# Patient Record
Sex: FEMALE | Race: ASIAN | NOT HISPANIC OR LATINO | URBAN - METROPOLITAN AREA
[De-identification: names, ages, dates, MRNs, and addresses within clinical notes are randomized per-mention and may not be internally consistent; named-entity substitution may affect disease eponyms.]

---

## 2017-12-07 ENCOUNTER — EMERGENCY (EMERGENCY)
Facility: HOSPITAL | Age: 82
LOS: 1 days | Discharge: HOME | End: 2017-12-07
Attending: EMERGENCY MEDICINE | Admitting: EMERGENCY MEDICINE
Payer: MEDICARE

## 2017-12-07 VITALS
TEMPERATURE: 100 F | RESPIRATION RATE: 18 BRPM | DIASTOLIC BLOOD PRESSURE: 98 MMHG | HEART RATE: 87 BPM | SYSTOLIC BLOOD PRESSURE: 166 MMHG | OXYGEN SATURATION: 98 %

## 2017-12-07 PROCEDURE — 99282 EMERGENCY DEPT VISIT SF MDM: CPT

## 2017-12-07 NOTE — ED ADULT NURSE NOTE - PMH
Hypertension, unspecified type Atrial fibrillation, unspecified type    Hypertension, unspecified type

## 2017-12-07 NOTE — ED PROVIDER NOTE - ATTENDING CONTRIBUTION TO CARE
Attending MD Camilo:   I personally have seen and examined this patient.  Physician assistant note reviewed and agree on plan of care and except where noted.  See below for details.     GRANDDAUGHTER ACTING AS  FOR MANDARIN.  DECLINED TELEPHONE .    85F with PMH including AFib on Plavix, HTN, GERD presents to the ED with bleeding L middle toe.  Reports that she was at podiatrist who shaves layers from the plantar aspect of toes when took too much and L middle toe began to bleed.  Has continued to do so since incident at 10am this morning.  Denies holding pressure.  Reports has applied gauze but not continuous pressure.  Denies dizziness, weakness, sensory changes.  Denies LOC. Denies fevers, chills.  On exam, NAD, moving all extremities, ambulating with steady gait, +L third/middle toe with slow oozing from a <1mm area despite continuous pressure for 5 min by this MD, no surrounding erythema, cap refill < 2s, moving toe, sensory grossly intact; A/P: 85F with slow oozing from 3rd L toe, will apply surgicel for hemostasis and discharge to follow up with PMD tomorrow. Family verbalized understanding.  Follow up instructions given, importance of follow up emphasized, return to ED parameters reviewed and patient verbalized understanding.  All questions answered, all concerns addressed.

## 2017-12-07 NOTE — ED ADULT NURSE NOTE - OBJECTIVE STATEMENT
86 yo female A&OX3 presents to the ED with the c/o l 2nd digit foot pain and bleeding s/p clipping nails. Pt states that she was cutting her nails when she cut the back of her toe. Pt on plavix, toe is still actively bleeding. No numbness or tingling, pt able to move toe. + pulses in l foot.

## 2017-12-07 NOTE — ED PROVIDER NOTE - CARE PLAN
Principal Discharge DX:	Abrasion  Instructions for follow-up, activity and diet:	1. You may apply more SurgiCel dressing as needed for continued bleeding.   2. Follow up with your Primary Care Physician as soon as possible for further evaluation.   3. Return to the Emergency Department for any concerning symptoms.

## 2017-12-07 NOTE — ED PROVIDER NOTE - OBJECTIVE STATEMENT
85 y.o. female hx of HTN, Afib on plavix p/w left middle toe bleeding. Patient was trimming her nails this morning and accidentally shaved off several layers of skin on the pad of the toe. She has been unable to the slow oozing of blood since then. She denies pain, numbness/tingling, redness, fevers, chills. 85 y.o. female hx of HTN, Afib on plavix p/w left middle toe bleeding. Patient was at her podiatrist who trimmed her nails this morning and accidentally shaved off several layers of skin on the pad of the toe. She has been unable to stop the slow oozing of blood since then. She denies pain, numbness/tingling, redness, fevers, chills.  Podiatrist: Dr. Johnnie Stokes

## 2017-12-07 NOTE — ED ADULT TRIAGE NOTE - CHIEF COMPLAINT QUOTE
Left 3rd toe bleeding since 1000 this morning. Left 3rd toe bleeding since 1000 this morning.  on plavix

## 2017-12-07 NOTE — ED PROVIDER NOTE - PHYSICAL EXAMINATION
Superficial abrasion on the pad of the left middle toe, slow ooze of blood, otherwise clean without surrounding erythema or purulent discharge.

## 2017-12-07 NOTE — ED PROVIDER NOTE - PLAN OF CARE
1. You may apply more SurgiCel dressing as needed for continued bleeding.   2. Follow up with your Primary Care Physician as soon as possible for further evaluation.   3. Return to the Emergency Department for any concerning symptoms.

## 2017-12-08 PROBLEM — Z00.00 ENCOUNTER FOR PREVENTIVE HEALTH EXAMINATION: Status: ACTIVE | Noted: 2017-12-08

## 2018-08-01 ENCOUNTER — OUTPATIENT (OUTPATIENT)
Dept: OUTPATIENT SERVICES | Facility: HOSPITAL | Age: 83
LOS: 1 days | End: 2018-08-01
Payer: MEDICARE

## 2018-08-01 PROCEDURE — G9001: CPT

## 2018-08-05 ENCOUNTER — EMERGENCY (EMERGENCY)
Facility: HOSPITAL | Age: 83
LOS: 1 days | Discharge: HOME | End: 2018-08-05
Attending: PERSONAL EMERGENCY RESPONSE ATTENDANT
Payer: MEDICARE

## 2018-08-05 VITALS
TEMPERATURE: 98 F | SYSTOLIC BLOOD PRESSURE: 155 MMHG | DIASTOLIC BLOOD PRESSURE: 89 MMHG | RESPIRATION RATE: 18 BRPM | OXYGEN SATURATION: 98 % | HEART RATE: 99 BPM

## 2018-08-05 PROCEDURE — 71045 X-RAY EXAM CHEST 1 VIEW: CPT | Mod: 26

## 2018-08-05 PROCEDURE — 73501 X-RAY EXAM HIP UNI 1 VIEW: CPT

## 2018-08-05 PROCEDURE — 72125 CT NECK SPINE W/O DYE: CPT | Mod: 26

## 2018-08-05 PROCEDURE — 99284 EMERGENCY DEPT VISIT MOD MDM: CPT | Mod: 25

## 2018-08-05 PROCEDURE — 71045 X-RAY EXAM CHEST 1 VIEW: CPT

## 2018-08-05 PROCEDURE — 70450 CT HEAD/BRAIN W/O DYE: CPT

## 2018-08-05 PROCEDURE — 70450 CT HEAD/BRAIN W/O DYE: CPT | Mod: 26

## 2018-08-05 PROCEDURE — 73502 X-RAY EXAM HIP UNI 2-3 VIEWS: CPT

## 2018-08-05 PROCEDURE — 99284 EMERGENCY DEPT VISIT MOD MDM: CPT | Mod: GC

## 2018-08-05 PROCEDURE — 72125 CT NECK SPINE W/O DYE: CPT

## 2018-08-05 PROCEDURE — 73523 X-RAY EXAM HIPS BI 5/> VIEWS: CPT | Mod: 26

## 2018-08-05 NOTE — ED PROVIDER NOTE - ATTENDING CONTRIBUTION TO CARE
Attending MD Marcano.  Agree with above.  Pt is an 86 yr old female with pmhx of afib on clopidogrel who presents to ED s/p mechanical fall at ~1920 today after her  fell and she was attempting to help him up and fell herself.  She endorses posterior head pain, L hip pain.  Was able to stand and ambulate following fall.  Denies n/v/neck pain.  No CP/palpitations before event.  No N/v/d/weakness/numbness/tingling.Pt has ~2 cm ecchymosis to posterior scalp.  CNII-XII intact, PERRLA, EOMI, 5/5 strength bilateral upper and lower extremities.  Intact distal pulses in all extremities.  Pelvic rock stable.  No abdominal TTP.  No LOC prior to, during or following event. Attending MD Marcano.  Agree with above.  Pt is an 86 yr old female with pmhx of afib on clopidogrel who presents to ED s/p mechanical fall at ~1920 today after her  fell and she was attempting to help him up and fell herself.  She endorses posterior head pain, L hip pain.  Was able to stand and ambulate following fall.  Denies n/v/neck pain.  No CP/palpitations before event.  No N/v/d/weakness/numbness/tingling.Pt has ~2 cm ecchymosis to posterior scalp.  CNII-XII intact, PERRLA, EOMI, 5/5 strength bilateral upper and lower extremities.  Intact distal pulses in all extremities.  Pelvic rock stable.  No abdominal TTP.  No LOC prior to, during or following event.  Pt noted to have a large firm hematoma to L inferior glut. No pulsatility, no skin openings.  REmains nvsc intact distal to site.  FROM of L hip, ambulatory without difficulty.  Imaging non-actionable.  Pt's granddaughter instructed that she should use warm compresses for comfort.  COunseled that she will likely have color change at this site with possible yellowing or greenish hue that is likely to become more dependent over time.  Counseled to return to ED for new/worsening pain, sig increased in size or any numbness/tingling of distal LLE.  Pt endorses improvement in sxs prior to discharge.  Granddaughter also updated R R lung Attending MD Marcano.  Agree with above.  Pt is an 86 yr old female with pmhx of afib on clopidogrel who presents to ED s/p mechanical fall at ~1920 today after her  fell and she was attempting to help him up and fell herself.  She endorses posterior head pain, L hip pain.  Was able to stand and ambulate following fall.  Denies n/v/neck pain.  No CP/palpitations before event.  No N/v/d/weakness/numbness/tingling.Pt has ~2 cm ecchymosis to posterior scalp.  CNII-XII intact, PERRLA, EOMI, 5/5 strength bilateral upper and lower extremities.  Intact distal pulses in all extremities.  Pelvic rock stable.  No abdominal TTP.  No LOC prior to, during or following event.  Pt noted to have a large firm hematoma to L inferior glut. No pulsatility, no skin openings.  REmains nvsc intact distal to site.  FROM of L hip, ambulatory without difficulty.  Imaging non-actionable.  Pt's granddaughter instructed that she should use warm compresses for comfort.  COunseled that she will likely have color change at this site with possible yellowing or greenish hue that is likely to become more dependent over time.  Counseled to return to ED for new/worsening pain, sig increased in size or any numbness/tingling of distal LLE.  Pt endorses improvement in sxs prior to discharge.  Granddaughter also updated R lung nodule that will require follow-up.  Stable for discharge, counseled to follow-up with PCP for ongoing management as needed.

## 2018-08-05 NOTE — ED PROVIDER NOTE - PLAN OF CARE
1) Please follow-up with your primary care doctor within the next week.  If you cannot follow-up with your doctor(s), please return to the ED for any urgent issues.  2) If you have any worsening of symptoms or any other concerns please return to the ED immediately.  3) Please continue taking your home medications as directed.  4) You may have been given a copy of your labs and/or imaging.  Please go over these with your primary care doctor.

## 2018-08-05 NOTE — ED ADULT NURSE NOTE - NSIMPLEMENTINTERV_GEN_ALL_ED
Implemented All Fall Risk Interventions:  [place] to call system. Call bell, personal items and telephone within reach. Instruct patient to call for assistance. Room bathroom lighting operational. Non-slip footwear when patient is off stretcher. Physically safe environment: no spills, clutter or unnecessary equipment. Stretcher in lowest position, wheels locked, appropriate side rails in place. Provide visual cue, wrist band, yellow gown, etc. Monitor gait and stability. Monitor for mental status changes and reorient to person, place, and time. Review medications for side effects contributing to fall risk. Reinforce activity limits and safety measures with patient and family.

## 2018-08-05 NOTE — ED PROVIDER NOTE - CARE PLAN
Principal Discharge DX:	Musculoskeletal pain  Assessment and plan of treatment:	1) Please follow-up with your primary care doctor within the next week.  If you cannot follow-up with your doctor(s), please return to the ED for any urgent issues.  2) If you have any worsening of symptoms or any other concerns please return to the ED immediately.  3) Please continue taking your home medications as directed.  4) You may have been given a copy of your labs and/or imaging.  Please go over these with your primary care doctor. Principal Discharge DX:	Musculoskeletal pain  Assessment and plan of treatment:	1) Please follow-up with your primary care doctor within the next week.  If you cannot follow-up with your doctor(s), please return to the ED for any urgent issues.  2) If you have any worsening of symptoms or any other concerns please return to the ED immediately.  3) Please continue taking your home medications as directed.  4) You may have been given a copy of your labs and/or imaging.  Please go over these with your primary care doctor.  Secondary Diagnosis:	Fall from standing, initial encounter

## 2018-08-05 NOTE — ED ADULT NURSE NOTE - OBJECTIVE STATEMENT
86y female presents to ED complaining of fall 86y female presents to ED complaining of fall. Pt is chinese speaking with family translating at bedside. Pt states her  fell and when trying to help him up she fell and hit the top of her head against foor. Pt on plavix for afib. Pt a/ox3 complaining of minor headache, denies any loc, also complaining of L hip pain with 5cm ecchymosis on buttock, tender to palp. Pt also has 2cm bruise on top of head. Pt ambulatory with steady gait. Pt breathing spontaneous and unlabored with lungs clear to auscultation bilaterally. Pt skin is warm, dry and intact with no edema present. Pt abdomen soft, nontender, nondistended. Pt PERRL, with equal strength bilaterally in upper and lower extremities with full sensation. Pt denies chest pain and SOB, denies n/v/d, denies fever/chills and cough, denies dysuria, denies numbness/tingling and weakness.

## 2018-08-05 NOTE — ED ADULT TRIAGE NOTE - CHIEF COMPLAINT QUOTE
Patient presents s/p trip and fall while helping  up. Patient states she fell and hit her head. Patient is currently on plavix.

## 2018-08-05 NOTE — ED PROVIDER NOTE - MEDICAL DECISION MAKING DETAILS
86F pmhx afib on plavix, presents s/p mechanical falls. Pt not endorsing any precipitation symptoms including cp, sob, palpitations. Small hematoma on top of head, no midline c spine ttp. No obvious msk deformities, pelvis stable, neuro intact. Since pt age >65 will obtain CT head, c-spine, cxr, L hip + pelvis films.

## 2018-08-05 NOTE — ED PROVIDER NOTE - OBJECTIVE STATEMENT
86F pmhx afib on plavix, htn, presenting s/p fall ~2 hours prior to arrival in ED. Per granddaughter (interpreting in chinese), pt was helping her  up off the floor after he fell, causing her fall herself. Pt struck the top of her head on the floor but did not lose consciousness. She has a mild headache since then but denies neck pain. She also complains of L hip pain after the fall. Not endorsing n/v, visual changes, problems balancing, sob/cp. Allergic to pcn and sulfa drugs.

## 2018-08-06 PROBLEM — I48.91 UNSPECIFIED ATRIAL FIBRILLATION: Chronic | Status: ACTIVE | Noted: 2017-12-07

## 2018-08-06 PROBLEM — I10 ESSENTIAL (PRIMARY) HYPERTENSION: Chronic | Status: ACTIVE | Noted: 2017-12-07

## 2018-08-29 DIAGNOSIS — Z71.89 OTHER SPECIFIED COUNSELING: ICD-10-CM

## 2019-02-15 ENCOUNTER — EMERGENCY (EMERGENCY)
Facility: HOSPITAL | Age: 84
LOS: 1 days | Discharge: HOME | End: 2019-02-15
Attending: EMERGENCY MEDICINE
Payer: MEDICARE

## 2019-02-15 VITALS
WEIGHT: 80.03 LBS | DIASTOLIC BLOOD PRESSURE: 82 MMHG | HEIGHT: 58 IN | RESPIRATION RATE: 19 BRPM | SYSTOLIC BLOOD PRESSURE: 161 MMHG | TEMPERATURE: 98 F | OXYGEN SATURATION: 98 % | HEART RATE: 83 BPM

## 2019-02-15 PROCEDURE — 99284 EMERGENCY DEPT VISIT MOD MDM: CPT

## 2019-02-15 NOTE — ED ADULT NURSE NOTE - NSIMPLEMENTINTERV_GEN_ALL_ED
Implemented All Universal Safety Interventions:  [place] to call system. Call bell, personal items and telephone within reach. Instruct patient to call for assistance. Room bathroom lighting operational. Non-slip footwear when patient is off stretcher. Physically safe environment: no spills, clutter or unnecessary equipment. Stretcher in lowest position, wheels locked, appropriate side rails in place.

## 2019-02-15 NOTE — ED ADULT NURSE NOTE - OBJECTIVE STATEMENT
87 YOF A&Ox3 presented to ED for right middle toe redness radiating to right calf rated 7/10 of pain. Upon assessment foot and calf warm to touch. Pulses present in all 4 extremities. Patient denies fevers/chills, sob, chest pain, n/v/d, headaches & blurry vision. Patient denies history of DVT's.

## 2019-02-16 VITALS
OXYGEN SATURATION: 98 % | SYSTOLIC BLOOD PRESSURE: 138 MMHG | HEART RATE: 78 BPM | DIASTOLIC BLOOD PRESSURE: 83 MMHG | TEMPERATURE: 98 F | RESPIRATION RATE: 18 BRPM

## 2019-02-16 LAB
ALBUMIN SERPL ELPH-MCNC: 4.8 G/DL — SIGNIFICANT CHANGE UP (ref 3.3–5)
ALP SERPL-CCNC: 58 U/L — SIGNIFICANT CHANGE UP (ref 40–120)
ALT FLD-CCNC: 8 U/L — LOW (ref 10–45)
ANION GAP SERPL CALC-SCNC: 13 MMOL/L — SIGNIFICANT CHANGE UP (ref 5–17)
APPEARANCE UR: CLEAR — SIGNIFICANT CHANGE UP
APTT BLD: 33.1 SEC — SIGNIFICANT CHANGE UP (ref 27.5–36.3)
AST SERPL-CCNC: 26 U/L — SIGNIFICANT CHANGE UP (ref 10–40)
BACTERIA # UR AUTO: 0 — SIGNIFICANT CHANGE UP
BASE EXCESS BLDV CALC-SCNC: 2.1 MMOL/L — HIGH (ref -2–2)
BASOPHILS # BLD AUTO: 0 K/UL — SIGNIFICANT CHANGE UP (ref 0–0.2)
BASOPHILS NFR BLD AUTO: 0.5 % — SIGNIFICANT CHANGE UP (ref 0–2)
BILIRUB SERPL-MCNC: 0.4 MG/DL — SIGNIFICANT CHANGE UP (ref 0.2–1.2)
BILIRUB UR-MCNC: NEGATIVE — SIGNIFICANT CHANGE UP
BUN SERPL-MCNC: 12 MG/DL — SIGNIFICANT CHANGE UP (ref 7–23)
CA-I SERPL-SCNC: 1.24 MMOL/L — SIGNIFICANT CHANGE UP (ref 1.12–1.3)
CALCIUM SERPL-MCNC: 9.8 MG/DL — SIGNIFICANT CHANGE UP (ref 8.4–10.5)
CHLORIDE BLDV-SCNC: 98 MMOL/L — SIGNIFICANT CHANGE UP (ref 96–108)
CHLORIDE SERPL-SCNC: 96 MMOL/L — SIGNIFICANT CHANGE UP (ref 96–108)
CO2 BLDV-SCNC: 29 MMOL/L — SIGNIFICANT CHANGE UP (ref 22–30)
CO2 SERPL-SCNC: 24 MMOL/L — SIGNIFICANT CHANGE UP (ref 22–31)
COLOR SPEC: COLORLESS — SIGNIFICANT CHANGE UP
CREAT SERPL-MCNC: 0.53 MG/DL — SIGNIFICANT CHANGE UP (ref 0.5–1.3)
DIFF PNL FLD: NEGATIVE — SIGNIFICANT CHANGE UP
EOSINOPHIL # BLD AUTO: 0.1 K/UL — SIGNIFICANT CHANGE UP (ref 0–0.5)
EOSINOPHIL NFR BLD AUTO: 1.6 % — SIGNIFICANT CHANGE UP (ref 0–6)
EPI CELLS # UR: 0 /HPF — SIGNIFICANT CHANGE UP
GAS PNL BLDV: 134 MMOL/L — LOW (ref 136–145)
GAS PNL BLDV: SIGNIFICANT CHANGE UP
GAS PNL BLDV: SIGNIFICANT CHANGE UP
GLUCOSE BLDV-MCNC: 91 MG/DL — SIGNIFICANT CHANGE UP (ref 70–99)
GLUCOSE SERPL-MCNC: 99 MG/DL — SIGNIFICANT CHANGE UP (ref 70–99)
GLUCOSE UR QL: NEGATIVE — SIGNIFICANT CHANGE UP
HCO3 BLDV-SCNC: 27 MMOL/L — SIGNIFICANT CHANGE UP (ref 21–29)
HCT VFR BLD CALC: 35 % — SIGNIFICANT CHANGE UP (ref 34.5–45)
HCT VFR BLDA CALC: 37 % — LOW (ref 39–50)
HGB BLD CALC-MCNC: 12.1 G/DL — SIGNIFICANT CHANGE UP (ref 11.5–15.5)
HGB BLD-MCNC: 12.2 G/DL — SIGNIFICANT CHANGE UP (ref 11.5–15.5)
HYALINE CASTS # UR AUTO: 0 /LPF — SIGNIFICANT CHANGE UP (ref 0–2)
INR BLD: 0.92 RATIO — SIGNIFICANT CHANGE UP (ref 0.88–1.16)
KETONES UR-MCNC: NEGATIVE — SIGNIFICANT CHANGE UP
LACTATE BLDV-MCNC: 0.8 MMOL/L — SIGNIFICANT CHANGE UP (ref 0.7–2)
LEUKOCYTE ESTERASE UR-ACNC: NEGATIVE — SIGNIFICANT CHANGE UP
LYMPHOCYTES # BLD AUTO: 1.1 K/UL — SIGNIFICANT CHANGE UP (ref 1–3.3)
LYMPHOCYTES # BLD AUTO: 14.7 % — SIGNIFICANT CHANGE UP (ref 13–44)
MCHC RBC-ENTMCNC: 31.1 PG — SIGNIFICANT CHANGE UP (ref 27–34)
MCHC RBC-ENTMCNC: 34.9 GM/DL — SIGNIFICANT CHANGE UP (ref 32–36)
MCV RBC AUTO: 89.1 FL — SIGNIFICANT CHANGE UP (ref 80–100)
MONOCYTES # BLD AUTO: 1.1 K/UL — HIGH (ref 0–0.9)
MONOCYTES NFR BLD AUTO: 13.8 % — SIGNIFICANT CHANGE UP (ref 2–14)
NEUTROPHILS # BLD AUTO: 5.3 K/UL — SIGNIFICANT CHANGE UP (ref 1.8–7.4)
NEUTROPHILS NFR BLD AUTO: 69.4 % — SIGNIFICANT CHANGE UP (ref 43–77)
NITRITE UR-MCNC: NEGATIVE — SIGNIFICANT CHANGE UP
PCO2 BLDV: 47 MMHG — SIGNIFICANT CHANGE UP (ref 35–50)
PH BLDV: 7.38 — SIGNIFICANT CHANGE UP (ref 7.35–7.45)
PH UR: 7.5 — SIGNIFICANT CHANGE UP (ref 5–8)
PLATELET # BLD AUTO: 180 K/UL — SIGNIFICANT CHANGE UP (ref 150–400)
PO2 BLDV: 32 MMHG — SIGNIFICANT CHANGE UP (ref 25–45)
POTASSIUM BLDV-SCNC: 3.7 MMOL/L — SIGNIFICANT CHANGE UP (ref 3.5–5.3)
POTASSIUM SERPL-MCNC: 3.9 MMOL/L — SIGNIFICANT CHANGE UP (ref 3.5–5.3)
POTASSIUM SERPL-SCNC: 3.9 MMOL/L — SIGNIFICANT CHANGE UP (ref 3.5–5.3)
PROT SERPL-MCNC: 7.5 G/DL — SIGNIFICANT CHANGE UP (ref 6–8.3)
PROT UR-MCNC: NEGATIVE — SIGNIFICANT CHANGE UP
PROTHROM AB SERPL-ACNC: 10.5 SEC — SIGNIFICANT CHANGE UP (ref 10–12.9)
RBC # BLD: 3.93 M/UL — SIGNIFICANT CHANGE UP (ref 3.8–5.2)
RBC # FLD: 13.7 % — SIGNIFICANT CHANGE UP (ref 10.3–14.5)
RBC CASTS # UR COMP ASSIST: 0 /HPF — SIGNIFICANT CHANGE UP (ref 0–4)
SAO2 % BLDV: 52 % — LOW (ref 67–88)
SODIUM SERPL-SCNC: 133 MMOL/L — LOW (ref 135–145)
SP GR SPEC: 1.01 — LOW (ref 1.01–1.02)
UROBILINOGEN FLD QL: NEGATIVE — SIGNIFICANT CHANGE UP
WBC # BLD: 7.7 K/UL — SIGNIFICANT CHANGE UP (ref 3.8–10.5)
WBC # FLD AUTO: 7.7 K/UL — SIGNIFICANT CHANGE UP (ref 3.8–10.5)
WBC UR QL: 0 /HPF — SIGNIFICANT CHANGE UP (ref 0–5)

## 2019-02-16 PROCEDURE — 84295 ASSAY OF SERUM SODIUM: CPT

## 2019-02-16 PROCEDURE — 73630 X-RAY EXAM OF FOOT: CPT | Mod: 26,RT

## 2019-02-16 PROCEDURE — 82330 ASSAY OF CALCIUM: CPT

## 2019-02-16 PROCEDURE — 82947 ASSAY GLUCOSE BLOOD QUANT: CPT

## 2019-02-16 PROCEDURE — 80053 COMPREHEN METABOLIC PANEL: CPT

## 2019-02-16 PROCEDURE — 85027 COMPLETE CBC AUTOMATED: CPT

## 2019-02-16 PROCEDURE — 85730 THROMBOPLASTIN TIME PARTIAL: CPT

## 2019-02-16 PROCEDURE — 81001 URINALYSIS AUTO W/SCOPE: CPT

## 2019-02-16 PROCEDURE — 85014 HEMATOCRIT: CPT

## 2019-02-16 PROCEDURE — 85610 PROTHROMBIN TIME: CPT

## 2019-02-16 PROCEDURE — 82435 ASSAY OF BLOOD CHLORIDE: CPT

## 2019-02-16 PROCEDURE — 83605 ASSAY OF LACTIC ACID: CPT

## 2019-02-16 PROCEDURE — 82803 BLOOD GASES ANY COMBINATION: CPT

## 2019-02-16 PROCEDURE — 84132 ASSAY OF SERUM POTASSIUM: CPT

## 2019-02-16 PROCEDURE — 73630 X-RAY EXAM OF FOOT: CPT

## 2019-02-16 PROCEDURE — 96374 THER/PROPH/DIAG INJ IV PUSH: CPT

## 2019-02-16 PROCEDURE — 99284 EMERGENCY DEPT VISIT MOD MDM: CPT | Mod: 25

## 2019-02-16 RX ORDER — SODIUM CHLORIDE 9 MG/ML
500 INJECTION INTRAMUSCULAR; INTRAVENOUS; SUBCUTANEOUS ONCE
Qty: 0 | Refills: 0 | Status: COMPLETED | OUTPATIENT
Start: 2019-02-16 | End: 2019-02-16

## 2019-02-16 RX ADMIN — SODIUM CHLORIDE 500 MILLILITER(S): 9 INJECTION INTRAMUSCULAR; INTRAVENOUS; SUBCUTANEOUS at 00:44

## 2019-02-16 RX ADMIN — Medication 100 MILLIGRAM(S): at 00:57

## 2019-02-16 NOTE — ED PROVIDER NOTE - OBJECTIVE STATEMENT
86yo female pt with PMHx of HTN c/o right foot pain/ swelling and redness for 6days. Pt stated she noticed right 3rd toe swelling/ redness initially and the redness' s worsen to right foot with swelling. Denies injury but she has a chronic cone on the tip of 3rd toe Denies fever, chills or cough/ congestion. Denies headache, dizziness or N/V. Denies sensory changes or weakness to extremities. Denies CP/SOB/ABD pain.  PMD- Ra Roberson.

## 2019-02-16 NOTE — ED ADULT NURSE REASSESSMENT NOTE - NS ED NURSE REASSESS COMMENT FT1
Patient at this time appears to be in no acute distress. Vital signs are stable. Patient denies sob, chest pain, n/v/d, headaches & blurry vision. patient awaiting foot x-ray. safety & comfort maintained.

## 2019-02-16 NOTE — ED PROVIDER NOTE - ATTENDING CONTRIBUTION TO CARE
I have seen and evaluated this patient with the [place] practice clinician.   I agree with the findings  unless other wise stated. I have amended notes where needed.  After my face to face bedside evaluation, I am noting: Pt with redness and swelling of right toe and over dorsum of right foot Had a callous at 3 toe treated No fever no palpable right femoral or inguinal nodes No antibiotics has been tried labs xrays no signs of osteo re eval done --Saldivar

## 2019-02-16 NOTE — ED PROVIDER NOTE - CLINICAL SUMMARY MEDICAL DECISION MAKING FREE TEXT BOX
Pt with redness and swelling of right toe and over dorsum of right foot Had a callous at 3 toe treated No fever no palpable right femoral or inguinal nodes No antibiotics has been tried labs xrays reeval--Saldivar

## 2019-02-16 NOTE — ED PROVIDER NOTE - PROGRESS NOTE DETAILS
CHRIS Sanchez MD : endorsed to me pending labs and xray - xray w calcification in toe where pain/ redness is, poss gout. no wornesing of redness or fever while in ED. initial plan was to admit to med for iv abx, however upon further discussion w family and pt, they are declining hospital admission at this time. will tx for likely gout w low dose steroids and motrin PO as outpt, as well as clinda for poss superimposed cellulitis. discussed r/b/a - family aware of concerns and strict return precautions. will f/u as outpt. additional verbal instructions regarding diagnosis, return precautions and follow up plan given to pt and/or family.

## 2019-02-16 NOTE — ED PROVIDER NOTE - PHYSICAL EXAMINATION
NAD, VSS, Afebrile, No spinal tender. Lungs clear. ABD soft, non tender. + A dry cone on the tip of 3rd toe with tender, cellulitis streaking to foot dorsum and distal tibia and warmth. N/V- intact.

## 2019-02-16 NOTE — ED PROVIDER NOTE - NSFOLLOWUPINSTRUCTIONS_ED_ALL_ED_FT
Please take antibiotics (clindamycin) as prescribed.     Also take prednisone (steroid) as prescribed.     You may also take motrin 200 mg every 6 hours as needed for pain.     You likely have Gout in the toe, which is an inflammatory condition.     You need to follow up with your regular doctor for a re-check, and for ongoing management for your Gout.     Return immediately to the ER or seek immediate medical care for any worsening redness, swelling, fever, or any other concerns.

## 2019-03-22 NOTE — ED ADULT NURSE NOTE - CAS DISCH TRANSFER METHOD
Patient informed of inr results  She will recheck in 2 weeks  Dose verified 3 mgs daily  Anti-coag updated   Private car

## 2019-08-27 ENCOUNTER — EMERGENCY (EMERGENCY)
Facility: HOSPITAL | Age: 84
LOS: 1 days | Discharge: HOME | End: 2019-08-27
Attending: EMERGENCY MEDICINE
Payer: MEDICARE

## 2019-08-27 VITALS
HEART RATE: 84 BPM | TEMPERATURE: 98 F | WEIGHT: 80.03 LBS | DIASTOLIC BLOOD PRESSURE: 85 MMHG | SYSTOLIC BLOOD PRESSURE: 146 MMHG | HEIGHT: 55 IN | RESPIRATION RATE: 18 BRPM | OXYGEN SATURATION: 99 %

## 2019-08-27 PROCEDURE — 99282 EMERGENCY DEPT VISIT SF MDM: CPT

## 2019-08-27 RX ORDER — TETANUS TOXOID, REDUCED DIPHTHERIA TOXOID AND ACELLULAR PERTUSSIS VACCINE, ADSORBED 5; 2.5; 8; 8; 2.5 [IU]/.5ML; [IU]/.5ML; UG/.5ML; UG/.5ML; UG/.5ML
0.5 SUSPENSION INTRAMUSCULAR ONCE
Refills: 0 | Status: COMPLETED | OUTPATIENT
Start: 2019-08-27 | End: 2019-08-27

## 2019-08-27 NOTE — ED PROVIDER NOTE - ATTENDING CONTRIBUTION TO CARE
88y/o F with h/o HTN, Afib (on plavix) presenting with left foot/ankle wound which she sustained approximately 1 hour ago while getting out of shower; hit foot on something (unsure what) and small skin tear sustained, with resulting bleeding that family was unable to control; called 911, EMS bandaged foot and brought her to ED for further evaluation.    On Physical Exam:  General: elderly, awake/alert, conversant (family requested to translate) and appears in NAD  HEENT: PERRL, MMM  Neck: no neck tenderness, no nuchal rigidity  Cardiac: normal s1, s2; RRR; no MGR  Lungs: CTABL  Abdomen: soft nontender/nondistended  : no bladder tenderness or distension  Skin: dried blood on left foot, no active bleeding; left medial ankle with pinpoint skin opening, with surrounding dry blood, no current bleeding.  No other visible wounds, wound is very superficial and 1mm in length or less  Extremities: no peripheral edema, no gross deformities  Neuro: no gross neurologic deficits    AP: wound care, update tetanus vaccination and dc; stable vitals, very superficial minimal wound.

## 2019-08-27 NOTE — ED PROVIDER NOTE - CARE PLAN
Principal Discharge DX:	Laceration of skin of left lower leg, initial encounter Principal Discharge DX:	Skin tear of left lower leg without complication, initial encounter

## 2019-08-27 NOTE — ED ADULT NURSE NOTE - OBJECTIVE STATEMENT
87 year old female presents to the ED via EMS from home s/p cutting L ankle in shower (unknown source of cut). PMH afib on plavix, HLD, HTN. Patient is A&Ox3, denies pain at site, lightheadedness, SOB, chest pain, nausea, vomiting, diarrhea. Patient endorses feeling weak since losing blood. EMS state pt. was awake when they arrived, able to ambulate, with copious amounts of blood around the room. Lac is no longer bleeding upon arrival to [facility]. Patient undressed and placed into gown, call bell in hand and side rails up with bed in lowest position for safety. blanket provided. Comfort and safety provided. 87 year old female presents to the ED via EMS from home s/p cutting L ankle in shower (unknown source of cut). PMH afib on plavix, HLD, HTN. Patient is A&Ox3, denies pain at site, lightheadedness, SOB, chest pain, nausea, vomiting, diarrhea. Patient endorses feeling weak since losing blood. EMS state pt. was awake when they arrived, able to ambulate, with copious amounts of blood around the room. Lac is no longer bleeding upon [facility] to [facility]. VSS. Patient undressed and placed into gown, call bell in hand and side rails up with bed in lowest position for safety. blanket provided. Comfort and safety provided.

## 2019-08-27 NOTE — ED PROVIDER NOTE - OBJECTIVE STATEMENT
88 yo female with pmh of afib on plavix, htn, hld, presenting with bleeding from laceration of left ankle approx 1 hour causing profuse bleeding. PT states she was in the shower and cut her ankle on something in the shower and saw blood which would not stop after compression, daughters called EMS for evaluation. Pt did not fall, no loc or head strike, no SOB, heart palpitations, difficulty breathing. pt declining offered translation services, daughter translating at bedside.

## 2019-08-27 NOTE — ED PROVIDER NOTE - PRINCIPAL DIAGNOSIS
Laceration of skin of left lower leg, initial encounter Skin tear of left lower leg without complication, initial encounter

## 2019-08-27 NOTE — ED ADULT NURSE NOTE - NSIMPLEMENTINTERV_GEN_ALL_ED
Implemented All Fall with Harm Risk Interventions:  [place] to call system. Call bell, personal items and telephone within reach. Instruct patient to call for assistance. Room bathroom lighting operational. Non-slip footwear when patient is off stretcher. Physically safe environment: no spills, clutter or unnecessary equipment. Stretcher in lowest position, wheels locked, appropriate side rails in place. Provide visual cue, wrist band, yellow gown, etc. Monitor gait and stability. Monitor for mental status changes and reorient to person, place, and time. Review medications for side effects contributing to fall risk. Reinforce activity limits and safety measures with patient and family. Provide visual clues: red socks.

## 2019-08-27 NOTE — ED PROVIDER NOTE - NS ED ROS FT
Constitutional: No fever or chills  Eyes: No visual changes, eye pain or redness  HEENT: No throat pain, ear pain, nasal pain. No nose bleeding.  CV: No chest pain or lower extremity edema  Resp: No SOB no cough  GI: No abd pain. No nausea or vomiting. No diarrhea. No constipation.   : No dysuria, hematuria.   MSK: No musculoskeletal pain  Skin: see hpi  Neuro: No headache. No numbness or tingling. No weakness.

## 2019-08-27 NOTE — ED PROVIDER NOTE - PATIENT PORTAL LINK FT
You can access the FollowMyHealth Patient Portal offered by [facility] by registering at the following website: [email or web address]. By joining [facility]’s FollowMyHealth portal, you will also be able to view your health information using other applications (apps) compatible with our system.

## 2019-08-27 NOTE — ED PROVIDER NOTE - NSFOLLOWUPINSTRUCTIONS_ED_ALL_ED_FT
- stay hydrated.  - take tylenol 975mg every 6 hours for pain  - follow up with your pcp in 1-2 days.  - keep dressing on for 24 hours, check tomorrow for redness, swelling or discharge. change bandage for a clean gauze daily or when soiled. clean daily gently with soap and water, pat dry and cover with bacitracin.   - return if symptoms worsen, fever, weakness, numbness/tingling, blurred vision, difficulty ambulating, redness, swelling, discharge around the opening and all other concerns.

## 2019-08-28 PROCEDURE — 90471 IMMUNIZATION ADMIN: CPT

## 2019-08-28 PROCEDURE — 99283 EMERGENCY DEPT VISIT LOW MDM: CPT | Mod: 25

## 2019-08-28 PROCEDURE — 90715 TDAP VACCINE 7 YRS/> IM: CPT

## 2019-08-28 RX ADMIN — TETANUS TOXOID, REDUCED DIPHTHERIA TOXOID AND ACELLULAR PERTUSSIS VACCINE, ADSORBED 0.5 MILLILITER(S): 5; 2.5; 8; 8; 2.5 SUSPENSION INTRAMUSCULAR at 00:32

## 2019-08-28 NOTE — ED PROCEDURE NOTE - ATTENDING CONTRIBUTION TO CARE
I have participated in and supervised all key portions of the above procedures and agree with the above documentation. GURDEEP Earl MD

## 2019-09-03 NOTE — ED ADULT NURSE NOTE - CHPI ED NUR SYMPTOMS NEG
Anticoagulation Summary  As of 9/3/2019    INR goal:   2.0-3.0   TTR:   78.6 % (4 d)   INR used for dosing:   3.10! (9/3/2019)   Warfarin maintenance plan:   2.5 mg (2.5 mg x 1) every Tue; 5 mg (2.5 mg x 2) all other days   Weekly warfarin total:   32.5 mg   Plan last modified:   Brooklyn Lewis, PharmD (9/3/2019)   Next INR check:   9/10/2019   Target end date:       Indications    Acute pulmonary embolism without acute cor pulmonale (HCC) [I26.99]  Deep vein thrombosis (DVT) of popliteal vein of both lower extremities (HCC) [I82.433]             Anticoagulation Episode Summary     INR check location:       Preferred lab:       Send INR reminders to:       Comments:         Anticoagulation Care Providers     Provider Role Specialty Phone number    Iveth Stone M.D. Referring Internal Medicine [phone]    [facility] Responsible  [phone]                Anticoagulation Patient Findings  Patient Findings     Negatives:   Signs/symptoms of thrombosis, Signs/symptoms of bleeding, Laboratory test error suspected, Change in health, Change in alcohol use, Change in activity, Upcoming invasive procedure, Emergency department visit, Upcoming dental procedure, Missed doses, Extra doses, Change in medications, Change in diet/appetite, Hospital admission, Bruising, Other complaints          HPI:  Magnus Gudino Shanon seen in clinic today, on anticoagulation therapy with Warfarin for Pulmonary embolism.   Changes to current medical/health status since last appt: none  Denies signs/symptoms of bleeding and/or thrombosis since the last appt.    Denies any interval changes to diet  Denies any interval changes to medications since last appt.   Denies any complications or cost restrictions with current therapy.   BP recorded in vitals.      A/P   INR  Supra-therapeutic.   Instructed pt to continue with REDUCED weekly regimen (-7%)      Follow up appointment in 1 week(s).    Sarath Nelson, Pharmacy  Intern    Brooklyn Lewis, PharmD           no fever/no pain/no purulent drainage/no rectal pain/no redness/no bleeding at site/no vomiting

## 2020-07-09 NOTE — ED PROVIDER NOTE - PHYSICAL EXAMINATION
A&Ox3, NAD. NCAT. PERRL, EOMI. Neck supple, no LAD. Lungs CTAB. +S1S2, RRR, No m/r/g. Abd soft, NT/ND, +BS, no rebound or guarding. Extremities: cap refill <2, pulses in distal extremities 4+, no edema. Skin: left medial ankle with pinpoint skin opening, with surrounding dry blood, no current bleeding. skin without rash. CN II-XII intact. Strength 5/5 UE/LE. Sensations intact throughout. Dispo: Social work working to find additional information on patient, family, and medical history  Transitions of Care Status:  1.  Name of PCP: No PCP  2.  PCP Contacted on Admission: [ ] Y    x] N    3.  PCP contacted at Discharge: [ ] Y    [ ] N    [ ] N/A  4.  Post-Discharge Appointment Date and Location:  5.  Summary of Handoff given to PCP:

## 2020-09-02 NOTE — ED PROVIDER NOTE - TOBACCO USE
Never smoker
Implemented All Fall Risk Interventions:  [place] to call system. Call bell, personal items and telephone within reach. Instruct patient to call for assistance. Room bathroom lighting operational. Non-slip footwear when patient is off stretcher. Physically safe environment: no spills, clutter or unnecessary equipment. Stretcher in lowest position, wheels locked, appropriate side rails in place. Provide visual cue, wrist band, yellow gown, etc. Monitor gait and stability. Monitor for mental status changes and reorient to person, place, and time. Review medications for side effects contributing to fall risk. Reinforce activity limits and safety measures with patient and family.

## 2022-12-01 NOTE — ED ADULT NURSE NOTE - CHPI ED NUR SYMPTOMS NEG
Anesthesia Type: 1% lidocaine with epinephrine no fever/no confusion/no deformity/no abrasion/no bleeding/no vomiting/no tingling/no weakness/no loss of consciousness/no numbness

## 2023-05-25 ENCOUNTER — INPATIENT (INPATIENT)
Facility: HOSPITAL | Age: 88
LOS: 6 days | Discharge: HOSPICE | DRG: 812 | End: 2023-06-01
Attending: STUDENT IN AN ORGANIZED HEALTH CARE EDUCATION/TRAINING PROGRAM | Admitting: HOSPITALIST
Payer: MEDICARE

## 2023-05-25 VITALS
RESPIRATION RATE: 19 BRPM | WEIGHT: 100.09 LBS | DIASTOLIC BLOOD PRESSURE: 96 MMHG | TEMPERATURE: 98 F | HEIGHT: 57 IN | OXYGEN SATURATION: 98 % | HEART RATE: 96 BPM | SYSTOLIC BLOOD PRESSURE: 153 MMHG

## 2023-05-25 LAB
ACANTHOCYTES BLD QL SMEAR: SLIGHT — SIGNIFICANT CHANGE UP
ALBUMIN SERPL ELPH-MCNC: 3.3 G/DL — SIGNIFICANT CHANGE UP (ref 3.3–5)
ALP SERPL-CCNC: 96 U/L — SIGNIFICANT CHANGE UP (ref 40–120)
ALT FLD-CCNC: 18 U/L — SIGNIFICANT CHANGE UP (ref 10–45)
ANION GAP SERPL CALC-SCNC: 12 MMOL/L — SIGNIFICANT CHANGE UP (ref 5–17)
ANISOCYTOSIS BLD QL: SLIGHT — SIGNIFICANT CHANGE UP
APPEARANCE UR: CLEAR — SIGNIFICANT CHANGE UP
AST SERPL-CCNC: 54 U/L — HIGH (ref 10–40)
BACTERIA # UR AUTO: NEGATIVE — SIGNIFICANT CHANGE UP
BASOPHILS # BLD AUTO: 0 K/UL — SIGNIFICANT CHANGE UP (ref 0–0.2)
BASOPHILS NFR BLD AUTO: 0 % — SIGNIFICANT CHANGE UP (ref 0–2)
BILIRUB SERPL-MCNC: 0.6 MG/DL — SIGNIFICANT CHANGE UP (ref 0.2–1.2)
BILIRUB UR-MCNC: NEGATIVE — SIGNIFICANT CHANGE UP
BUN SERPL-MCNC: 18 MG/DL — SIGNIFICANT CHANGE UP (ref 7–23)
CALCIUM SERPL-MCNC: 7.9 MG/DL — LOW (ref 8.4–10.5)
CHLORIDE SERPL-SCNC: 95 MMOL/L — LOW (ref 96–108)
CO2 SERPL-SCNC: 23 MMOL/L — SIGNIFICANT CHANGE UP (ref 22–31)
COLOR SPEC: YELLOW — SIGNIFICANT CHANGE UP
CREAT SERPL-MCNC: 0.6 MG/DL — SIGNIFICANT CHANGE UP (ref 0.5–1.3)
DACRYOCYTES BLD QL SMEAR: SLIGHT — SIGNIFICANT CHANGE UP
DIFF PNL FLD: NEGATIVE — SIGNIFICANT CHANGE UP
EGFR: 85 ML/MIN/1.73M2 — SIGNIFICANT CHANGE UP
EOSINOPHIL # BLD AUTO: 0.1 K/UL — SIGNIFICANT CHANGE UP (ref 0–0.5)
EOSINOPHIL NFR BLD AUTO: 1.8 % — SIGNIFICANT CHANGE UP (ref 0–6)
EPI CELLS # UR: 2 /HPF — SIGNIFICANT CHANGE UP
GIANT PLATELETS BLD QL SMEAR: PRESENT — SIGNIFICANT CHANGE UP
GLUCOSE SERPL-MCNC: 107 MG/DL — HIGH (ref 70–99)
GLUCOSE UR QL: NEGATIVE — SIGNIFICANT CHANGE UP
HCT VFR BLD CALC: 28.9 % — LOW (ref 34.5–45)
HGB BLD-MCNC: 9.2 G/DL — LOW (ref 11.5–15.5)
HYALINE CASTS # UR AUTO: 13 /LPF — HIGH (ref 0–2)
HYPOCHROMIA BLD QL: SLIGHT — SIGNIFICANT CHANGE UP
KETONES UR-MCNC: SIGNIFICANT CHANGE UP
LEUKOCYTE ESTERASE UR-ACNC: NEGATIVE — SIGNIFICANT CHANGE UP
LYMPHOCYTES # BLD AUTO: 0.56 K/UL — LOW (ref 1–3.3)
LYMPHOCYTES # BLD AUTO: 9.6 % — LOW (ref 13–44)
MACROCYTES BLD QL: SLIGHT — SIGNIFICANT CHANGE UP
MAGNESIUM SERPL-MCNC: 1.4 MG/DL — LOW (ref 1.6–2.6)
MANUAL SMEAR VERIFICATION: SIGNIFICANT CHANGE UP
MCHC RBC-ENTMCNC: 23.8 PG — LOW (ref 27–34)
MCHC RBC-ENTMCNC: 31.8 GM/DL — LOW (ref 32–36)
MCV RBC AUTO: 74.7 FL — LOW (ref 80–100)
MICROCYTES BLD QL: SLIGHT — SIGNIFICANT CHANGE UP
MONOCYTES # BLD AUTO: 0.41 K/UL — SIGNIFICANT CHANGE UP (ref 0–0.9)
MONOCYTES NFR BLD AUTO: 7 % — SIGNIFICANT CHANGE UP (ref 2–14)
NEUTROPHILS # BLD AUTO: 4.74 K/UL — SIGNIFICANT CHANGE UP (ref 1.8–7.4)
NEUTROPHILS NFR BLD AUTO: 81.6 % — HIGH (ref 43–77)
NITRITE UR-MCNC: NEGATIVE — SIGNIFICANT CHANGE UP
NT-PROBNP SERPL-SCNC: 3033 PG/ML — HIGH (ref 0–300)
PH UR: 6.5 — SIGNIFICANT CHANGE UP (ref 5–8)
PHOSPHATE SERPL-MCNC: 2.6 MG/DL — SIGNIFICANT CHANGE UP (ref 2.5–4.5)
PLAT MORPH BLD: NORMAL — SIGNIFICANT CHANGE UP
PLATELET # BLD AUTO: 292 K/UL — SIGNIFICANT CHANGE UP (ref 150–400)
POIKILOCYTOSIS BLD QL AUTO: SLIGHT — SIGNIFICANT CHANGE UP
POLYCHROMASIA BLD QL SMEAR: SLIGHT — SIGNIFICANT CHANGE UP
POTASSIUM SERPL-MCNC: 3.1 MMOL/L — LOW (ref 3.5–5.3)
POTASSIUM SERPL-SCNC: 3.1 MMOL/L — LOW (ref 3.5–5.3)
PROT SERPL-MCNC: 5.8 G/DL — LOW (ref 6–8.3)
PROT UR-MCNC: ABNORMAL
RBC # BLD: 3.87 M/UL — SIGNIFICANT CHANGE UP (ref 3.8–5.2)
RBC # BLD: 3.87 M/UL — SIGNIFICANT CHANGE UP (ref 3.8–5.2)
RBC # FLD: 19.5 % — HIGH (ref 10.3–14.5)
RBC BLD AUTO: ABNORMAL
RBC CASTS # UR COMP ASSIST: 10 /HPF — HIGH (ref 0–4)
RETICS #: 52.6 K/UL — SIGNIFICANT CHANGE UP (ref 25–125)
RETICS/RBC NFR: 1.4 % — SIGNIFICANT CHANGE UP (ref 0.5–2.5)
SCHISTOCYTES BLD QL AUTO: SLIGHT — SIGNIFICANT CHANGE UP
SODIUM SERPL-SCNC: 130 MMOL/L — LOW (ref 135–145)
SP GR SPEC: 1.02 — SIGNIFICANT CHANGE UP (ref 1.01–1.02)
SPHEROCYTES BLD QL SMEAR: SLIGHT — SIGNIFICANT CHANGE UP
TARGETS BLD QL SMEAR: SLIGHT — SIGNIFICANT CHANGE UP
UROBILINOGEN FLD QL: NEGATIVE — SIGNIFICANT CHANGE UP
WBC # BLD: 5.81 K/UL — SIGNIFICANT CHANGE UP (ref 3.8–10.5)
WBC # FLD AUTO: 5.81 K/UL — SIGNIFICANT CHANGE UP (ref 3.8–10.5)
WBC UR QL: 6 /HPF — HIGH (ref 0–5)

## 2023-05-25 PROCEDURE — 99285 EMERGENCY DEPT VISIT HI MDM: CPT | Mod: GC

## 2023-05-25 RX ORDER — MAGNESIUM SULFATE 500 MG/ML
1 VIAL (ML) INJECTION ONCE
Refills: 0 | Status: COMPLETED | OUTPATIENT
Start: 2023-05-25 | End: 2023-05-25

## 2023-05-25 RX ORDER — POTASSIUM CHLORIDE 20 MEQ
40 PACKET (EA) ORAL ONCE
Refills: 0 | Status: COMPLETED | OUTPATIENT
Start: 2023-05-25 | End: 2023-05-25

## 2023-05-25 RX ORDER — MAGNESIUM OXIDE 400 MG ORAL TABLET 241.3 MG
400 TABLET ORAL ONCE
Refills: 0 | Status: COMPLETED | OUTPATIENT
Start: 2023-05-25 | End: 2023-05-25

## 2023-05-25 RX ORDER — SODIUM CHLORIDE 9 MG/ML
1000 INJECTION INTRAMUSCULAR; INTRAVENOUS; SUBCUTANEOUS ONCE
Refills: 0 | Status: COMPLETED | OUTPATIENT
Start: 2023-05-25 | End: 2023-05-25

## 2023-05-25 RX ADMIN — SODIUM CHLORIDE 1000 MILLILITER(S): 9 INJECTION INTRAMUSCULAR; INTRAVENOUS; SUBCUTANEOUS at 22:13

## 2023-05-25 RX ADMIN — Medication 40 MILLIEQUIVALENT(S): at 22:13

## 2023-05-25 NOTE — ED PROVIDER NOTE - NSICDXPASTMEDICALHX_GEN_ALL_CORE_FT
PAST MEDICAL HISTORY:  Atrial fibrillation, unspecified type     Hypertension, unspecified type

## 2023-05-25 NOTE — ED PROVIDER NOTE - PHYSICAL EXAMINATION
General: Alert and Orientated x 3. No apparent distress.  Head: Normocephalic and atraumatic.  Eyes: PERRLA with EOMI.  Neck: Supple. Trachea midline.   Cardiac: Normal S1 and S2 w/ RRR. No murmurs appreciated. No JVD appreciated.  Pulmonary: CTA bilaterally. No increased WOB. No wheezes or crackles.  Abdominal: Soft, non-tender. (+) bowel sounds appreciated in all 4 quadrants. No hepatosplenomegaly.   Neurologic: No focal sensory or motor deficits.  Musculoskeletal: Strength appropriate in all 4 extremities for age with no limited ROM. nonpitting edema   Upper and lower extremities.  Skin: Color appropriate for race. Intact, warm, and well-perfused.  Psychiatric: Appropriate mood and affect. No apparent risk to self or others.

## 2023-05-25 NOTE — ED PROVIDER NOTE - ATTENDING CONTRIBUTION TO CARE
88 yo female with pmh of afib on plavix, htn, hld p/w low k from home, with difficult chinese dialect, awaiting daughter for further history. pt well appearing, abd soft, nt, oral mucosa moist, ivf, k replacement discuss further with family for dispo, vss.

## 2023-05-25 NOTE — ED PROVIDER NOTE - NS ED ROS FT
CONSTITUTIONAL: see hpi   EYES: no visual changes, no eye pain  EARS: no ear drainage, no ear pain, no change in hearing  NOSE: no nasal congestion  MOUTH/THROAT: no sore throat  CV: No chest pain, no palpitations  RESP: No SOB, no cough  GI: No n/v/d, no abd pain  : no dysuria, no hematuria, no flank pain  MSK: no back pain, no extremity pain  SKIN: no rashes  NEURO: no headache, no focal weakness, no decreased sensation/parasthesias   PSYCHIATRIC: no known mental health issues

## 2023-05-25 NOTE — ED ADULT NURSE NOTE - OBJECTIVE STATEMENT
89YF A&OX4 Ambulatory PMHX of Afib, HTN presents to the ED c/o generalized weakness & generalized edema and abnormal lab results. pt received lab results ordered by Dr. Terrance Knapp PCP showing potassium 2.9 and blood glucose of 59. daughter in law at bedside states that pt gait has been more unsteady and needs assistance in walking, sitting, and ambulating when before, she was able to ambulate independently. pt denies recent falls, CP, SOB, f/c/n/v/d 89YF A&OX4 Ambulatory PMHX of Afib, HTN presents to the ED c/o generalized weakness & generalized edema and abnormal lab results. pt received lab results ordered by Dr. Terrance Knapp PCP showing potassium 2.9 and blood glucose of 59. daughter in law ( at bedside states that pt gait has been more unsteady and needs assistance in walking, sitting, and ambulating when before, she was able to ambulate independently. pt denies recent falls, CP, SOB, f/c/n/v/d

## 2023-05-25 NOTE — ED PROVIDER NOTE - OBJECTIVE STATEMENT
Patient is a 89-year-old female with history of hypertension presenting to ED with generalized weakness and abnormal labs.  Patient had lab work done 2 days ago showing potassium of 2.9 and blood glucose of 59.  Daughter  in law at bedside states that she has been having generalized weakness.  Unable to get up on her own.  Patient is unable to ambulate.  She is more unsteady on her gait.  No falls or injuries.  Patient is also planing of generalized edema.  No chest pain, shortness of breath, nausea, vomiting, fever, chills.    Patient speaks specific dialect of Mandarin,  unable to find .  Daughter-in-law is interpreting.  PMD: dillon Florentino Patient is a 89-year-old female with history of Afib, hypertension presenting to ED with generalized weakness and abnormal labs.  Patient had lab work done 2 days ago showing potassium of 2.9 and blood glucose of 59.  Daughter  in law at bedside states that she has been having generalized weakness.  Unable to get up on her own.  Patient is unable to ambulate.  She is more unsteady on her gait.  No falls or injuries.  Patient is also planing of generalized edema.  No chest pain, shortness of breath, nausea, vomiting, fever, chills.    Patient speaks specific dialect of Mandarin,  unable to find .  Daughter-in-law is interpreting.  PMD: dillon Florentino

## 2023-05-25 NOTE — ED PROVIDER NOTE - PROGRESS NOTE DETAILS
Amado Ding MD (PGY2): Given patient likely presenting with failure to thrive, decreased p.o. intake, inability to ambulate will admit for further work-up.  Goal is to have patient return back home with family.  Given patient has known A-fib, no EKG changes suggestive of hypokalemia or Am[facility] EMEA, will admit under telemetry as a precaution. endorsed to dr. raygoza

## 2023-05-25 NOTE — ED PROVIDER NOTE - RAPID ASSESSMENT
pt briefly evaluated in triage as rapid assessment  protocol with limited history, physical exam and assessment performed -- care to be undertaken by the main ED team      pt w/ daughter in law (easily translating) c/o increasing fatigue/weakness, blood testing 2 days ago w/ hypokalemia (2.9) and hypoglycemia (59), no delirium    Rigoberto Tamayo MD

## 2023-05-26 DIAGNOSIS — Z29.9 ENCOUNTER FOR PROPHYLACTIC MEASURES, UNSPECIFIED: ICD-10-CM

## 2023-05-26 DIAGNOSIS — D50.9 IRON DEFICIENCY ANEMIA, UNSPECIFIED: ICD-10-CM

## 2023-05-26 DIAGNOSIS — I48.21 PERMANENT ATRIAL FIBRILLATION: ICD-10-CM

## 2023-05-26 DIAGNOSIS — R62.7 ADULT FAILURE TO THRIVE: ICD-10-CM

## 2023-05-26 DIAGNOSIS — E87.8 OTHER DISORDERS OF ELECTROLYTE AND FLUID BALANCE, NOT ELSEWHERE CLASSIFIED: ICD-10-CM

## 2023-05-26 LAB
ANION GAP SERPL CALC-SCNC: 15 MMOL/L — SIGNIFICANT CHANGE UP (ref 5–17)
AST SERPL-CCNC: 49 U/L — HIGH (ref 10–40)
BUN SERPL-MCNC: 14 MG/DL — SIGNIFICANT CHANGE UP (ref 7–23)
CALCIUM SERPL-MCNC: 7.6 MG/DL — LOW (ref 8.4–10.5)
CHLORIDE SERPL-SCNC: 96 MMOL/L — SIGNIFICANT CHANGE UP (ref 96–108)
CO2 SERPL-SCNC: 20 MMOL/L — LOW (ref 22–31)
CREAT SERPL-MCNC: 0.54 MG/DL — SIGNIFICANT CHANGE UP (ref 0.5–1.3)
CULTURE RESULTS: SIGNIFICANT CHANGE UP
EGFR: 87 ML/MIN/1.73M2 — SIGNIFICANT CHANGE UP
FERRITIN SERPL-MCNC: 36 NG/ML — SIGNIFICANT CHANGE UP (ref 15–150)
GLUCOSE BLDC GLUCOMTR-MCNC: 119 MG/DL — HIGH (ref 70–99)
GLUCOSE BLDC GLUCOMTR-MCNC: 154 MG/DL — HIGH (ref 70–99)
GLUCOSE BLDC GLUCOMTR-MCNC: 81 MG/DL — SIGNIFICANT CHANGE UP (ref 70–99)
GLUCOSE BLDC GLUCOMTR-MCNC: 89 MG/DL — SIGNIFICANT CHANGE UP (ref 70–99)
GLUCOSE SERPL-MCNC: 77 MG/DL — SIGNIFICANT CHANGE UP (ref 70–99)
HCT VFR BLD CALC: 29.2 % — LOW (ref 34.5–45)
HGB BLD-MCNC: 9.2 G/DL — LOW (ref 11.5–15.5)
IRON SATN MFR SERPL: 22 UG/DL — LOW (ref 30–160)
IRON SATN MFR SERPL: 6 % — LOW (ref 14–50)
MAGNESIUM SERPL-MCNC: 1.6 MG/DL — SIGNIFICANT CHANGE UP (ref 1.6–2.6)
MCHC RBC-ENTMCNC: 24 PG — LOW (ref 27–34)
MCHC RBC-ENTMCNC: 31.5 GM/DL — LOW (ref 32–36)
MCV RBC AUTO: 76.2 FL — LOW (ref 80–100)
NRBC # BLD: 0 /100 WBCS — SIGNIFICANT CHANGE UP (ref 0–0)
PHOSPHATE SERPL-MCNC: 2 MG/DL — LOW (ref 2.5–4.5)
PLATELET # BLD AUTO: 241 K/UL — SIGNIFICANT CHANGE UP (ref 150–400)
POTASSIUM SERPL-MCNC: 3.4 MMOL/L — LOW (ref 3.5–5.3)
POTASSIUM SERPL-SCNC: 3.4 MMOL/L — LOW (ref 3.5–5.3)
RBC # BLD: 3.83 M/UL — SIGNIFICANT CHANGE UP (ref 3.8–5.2)
RBC # FLD: 19.7 % — HIGH (ref 10.3–14.5)
SODIUM SERPL-SCNC: 131 MMOL/L — LOW (ref 135–145)
SPECIMEN SOURCE: SIGNIFICANT CHANGE UP
T3 SERPL-MCNC: 58 NG/DL — LOW (ref 80–200)
T4 AB SER-ACNC: 5.7 UG/DL — SIGNIFICANT CHANGE UP (ref 4.6–12)
TIBC SERPL-MCNC: 348 UG/DL — SIGNIFICANT CHANGE UP (ref 220–430)
TROPONIN T, HIGH SENSITIVITY RESULT: 25 NG/L — SIGNIFICANT CHANGE UP (ref 0–51)
TROPONIN T, HIGH SENSITIVITY RESULT: 28 NG/L — SIGNIFICANT CHANGE UP (ref 0–51)
TSH SERPL-MCNC: 2.96 UIU/ML — SIGNIFICANT CHANGE UP (ref 0.27–4.2)
UIBC SERPL-MCNC: 326 UG/DL — SIGNIFICANT CHANGE UP (ref 110–370)
WBC # BLD: 5.77 K/UL — SIGNIFICANT CHANGE UP (ref 3.8–10.5)
WBC # FLD AUTO: 5.77 K/UL — SIGNIFICANT CHANGE UP (ref 3.8–10.5)

## 2023-05-26 PROCEDURE — 74177 CT ABD & PELVIS W/CONTRAST: CPT | Mod: 26

## 2023-05-26 PROCEDURE — 93306 TTE W/DOPPLER COMPLETE: CPT | Mod: 26

## 2023-05-26 PROCEDURE — 99223 1ST HOSP IP/OBS HIGH 75: CPT

## 2023-05-26 RX ORDER — ACETAMINOPHEN 500 MG
650 TABLET ORAL EVERY 6 HOURS
Refills: 0 | Status: DISCONTINUED | OUTPATIENT
Start: 2023-05-26 | End: 2023-06-01

## 2023-05-26 RX ORDER — POTASSIUM CHLORIDE 20 MEQ
20 PACKET (EA) ORAL ONCE
Refills: 0 | Status: COMPLETED | OUTPATIENT
Start: 2023-05-26 | End: 2023-05-26

## 2023-05-26 RX ORDER — FUROSEMIDE 40 MG
40 TABLET ORAL DAILY
Refills: 0 | Status: DISCONTINUED | OUTPATIENT
Start: 2023-05-26 | End: 2023-05-29

## 2023-05-26 RX ORDER — ERGOCALCIFEROL 1.25 MG/1
1 CAPSULE ORAL
Refills: 0 | DISCHARGE

## 2023-05-26 RX ORDER — METOPROLOL TARTRATE 50 MG
50 TABLET ORAL DAILY
Refills: 0 | Status: DISCONTINUED | OUTPATIENT
Start: 2023-05-26 | End: 2023-06-01

## 2023-05-26 RX ORDER — LIPASE/PROTEASE/AMYLASE 16-48-48K
1 CAPSULE,DELAYED RELEASE (ENTERIC COATED) ORAL
Refills: 0 | DISCHARGE

## 2023-05-26 RX ORDER — ATORVASTATIN CALCIUM 80 MG/1
1 TABLET, FILM COATED ORAL
Refills: 0 | DISCHARGE

## 2023-05-26 RX ORDER — METOPROLOL TARTRATE 50 MG
1 TABLET ORAL
Refills: 0 | DISCHARGE

## 2023-05-26 RX ORDER — FERROUS SULFATE 325(65) MG
325 TABLET ORAL DAILY
Refills: 0 | Status: DISCONTINUED | OUTPATIENT
Start: 2023-05-26 | End: 2023-06-01

## 2023-05-26 RX ORDER — LANOLIN ALCOHOL/MO/W.PET/CERES
3 CREAM (GRAM) TOPICAL AT BEDTIME
Refills: 0 | Status: DISCONTINUED | OUTPATIENT
Start: 2023-05-26 | End: 2023-06-01

## 2023-05-26 RX ORDER — APIXABAN 2.5 MG/1
1 TABLET, FILM COATED ORAL
Refills: 0 | DISCHARGE

## 2023-05-26 RX ORDER — ASCORBIC ACID 60 MG
1 TABLET,CHEWABLE ORAL
Refills: 0 | DISCHARGE

## 2023-05-26 RX ORDER — MAGNESIUM SULFATE 500 MG/ML
1 VIAL (ML) INJECTION ONCE
Refills: 0 | Status: COMPLETED | OUTPATIENT
Start: 2023-05-26 | End: 2023-05-26

## 2023-05-26 RX ORDER — ATORVASTATIN CALCIUM 80 MG/1
10 TABLET, FILM COATED ORAL AT BEDTIME
Refills: 0 | Status: DISCONTINUED | OUTPATIENT
Start: 2023-05-26 | End: 2023-06-01

## 2023-05-26 RX ORDER — DOCUSATE SODIUM 100 MG
1 CAPSULE ORAL
Refills: 0 | DISCHARGE

## 2023-05-26 RX ADMIN — Medication 20 MILLIEQUIVALENT(S): at 13:28

## 2023-05-26 RX ADMIN — Medication 100 GRAM(S): at 13:28

## 2023-05-26 RX ADMIN — Medication 100 GRAM(S): at 00:07

## 2023-05-26 RX ADMIN — Medication 40 MILLIGRAM(S): at 14:23

## 2023-05-26 RX ADMIN — Medication 50 MILLIGRAM(S): at 13:28

## 2023-05-26 RX ADMIN — Medication 5 MILLIGRAM(S): at 13:28

## 2023-05-26 RX ADMIN — ATORVASTATIN CALCIUM 10 MILLIGRAM(S): 80 TABLET, FILM COATED ORAL at 22:02

## 2023-05-26 NOTE — CHART NOTE - NSCHARTNOTEFT_GEN_A_CORE
Called by Dr. Sierra re: TTE results. PAtient with severe pulmonary pressures of 95 with RA, RV enlargement and severe TR. Likely more chronic in nature and not acute. LVEF 47%  CAlled Patient's granddaughter, DR. Baker regarding results and eventual workup, including RHC, at this time would want conservative management.   Given age, workup for new microcytic anemia and right sided dysfunction, will hold off pulm/cards consult for now as at this point no RHC on table, would not qualify for group I agents.  plan: replete electrolytes. HOLD ACE-i to allow BP room. Start diuretics (lasix 40mg IV daily to start to offload RV, uptitrate as necessary), monitoring electrolytes closely.  -strict I/O, daily standing weights.  -palliative care consult.   -follow up CT abd/pelvis.

## 2023-05-26 NOTE — PROVIDER CONTACT NOTE (OTHER) - ACTION/TREATMENT ORDERED:
Provider made aware. Wound care consult order entered, as well as nutrition order. Cavillon was placed on wound. Will endorse to next shift. Monitoring remains ongoing.

## 2023-05-26 NOTE — H&P ADULT - ENMT COMMENTS
poor dentition with drooped face (on right, chronic per family_ 2/2 no teeth on left side of gums. Droop resolves with smiling and puffing out cheeks

## 2023-05-26 NOTE — H&P ADULT - NSHPLABSRESULTS_GEN_ALL_CORE
9.2    5.81  )-----------( 292      ( 25 May 2023 20:33 )             28.9   05-25    130<L>  |  95<L>  |  18  ----------------------------<  107<H>  3.1<L>   |  23  |  0.60    Ca    7.9<L>      25 May 2023 20:33  Phos  2.6     05-25  Mg     1.4     05-25    TPro  5.8<L>  /  Alb  3.3  /  TBili  0.6  /  DBili  x   /  AST  54<H>  /  ALT  18  /  AlkPhos  96  05-25    ECG personally reviewed: A-fib with RBBB (unclear if new or old), Prolonged QTc of 510, widened QRS interval.

## 2023-05-26 NOTE — H&P ADULT - NSHPPHYSICALEXAM_GEN_ALL_CORE
Vital Signs Last 24 Hrs  T(C): 36.4 (26 May 2023 03:42), Max: 36.9 (26 May 2023 03:03)  T(F): 97.5 (26 May 2023 03:42), Max: 98.4 (26 May 2023 03:03)  HR: 62 (26 May 2023 03:42) (62 - 96)  BP: 147/93 (26 May 2023 03:42) (138/94 - 153/96)  BP(mean): --  RR: 18 (26 May 2023 03:42) (18 - 19)  SpO2: 98% (26 May 2023 03:42) (97% - 100%)    Parameters below as of 26 May 2023 03:42  Patient On (Oxygen Delivery Method): room air

## 2023-05-26 NOTE — H&P ADULT - ASSESSMENT
89 yo female PMHx HTN, Permanent Atrial fibrillation admitted [facility] to thrive, concern for right sided heart failure, and new microcytic anemia

## 2023-05-26 NOTE — H&P ADULT - NSICDXPASTMEDICALHX_GEN_ALL_CORE_FT
Decrease Prozac to 40 mg daily for 1 week, then every other day for 1 week. Start Paxil 20 mg daily. Recheck in 1 month.
PAST MEDICAL HISTORY:  Atrial fibrillation, unspecified type     Hypertension, unspecified type

## 2023-05-26 NOTE — PATIENT PROFILE ADULT - FALL HARM RISK - HARM RISK INTERVENTIONS
Assistance with ambulation/Assistance OOB with selected safe patient handling equipment/Communicate Risk of Fall with Harm to all staff/Discuss with provider need for PT consult/Monitor gait and stability/Reinforce activity limits and safety measures with patient and family/Tailored Fall Risk Interventions/Visual Cue: Yellow wristband and red socks/Bed in lowest position, wheels locked, appropriate side rails in place/Call bell, personal items and telephone in reach/Instruct patient to call for assistance before getting out of bed or chair/Non-slip footwear when patient is out of bed/[place] to call system/Physically safe environment - no spills, clutter or unnecessary equipment/Purposeful Proactive Rounding/Room/bathroom lighting operational, light cord in reach

## 2023-05-26 NOTE — H&P ADULT - HISTORY OF PRESENT ILLNESS
89 yo female PMHx permanent atrial fibrillation, HTN presents with 2 months of owrsening lower extremity edema, found to have severe electrolyte abnormalities and sent to ED by PMd. Per patietn, has been feeling weaker at home, with worsenin gSOB with exertion. No chest pain. +10 lb weight gain. +early satiety, +thinned stools, no n/v/d/c, no melena or hematochezia. Went to PMD intiially 2 months ago, no workup, went yesterday because worsening, send to ED after labs returned. currently no furhter complaints than above. +Hungry.    On collateral information from patient's PMD, last colonoscopy was in 2007, TTE was 2018, CBC in 1/2023 11.7 with MCV 89.

## 2023-05-26 NOTE — H&P ADULT - PROBLEM SELECTOR PLAN 4
given new anemia, hold off A/c at this time,   -patient and family did not have medications, asked pharmacist for assistance

## 2023-05-26 NOTE — PROVIDER CONTACT NOTE (OTHER) - ASSESSMENT
Patient remains aox4;vss, Mandarin speaking only. RN non unit able to translate. Pt denies any pain/ distress on sacrum. Pt also denies any cp, and sob.

## 2023-05-26 NOTE — H&P ADULT - PROBLEM SELECTOR PLAN 1
in setting of swelling LE x 2 months, POTTER and early satiety.   -on exam, clear lungs, Le edema, +JVD, concerning for more right sided pathology\  -check troponins (noted elevated isolated AST, though could be liver pathology as well)  -replete electrolytes   -TTE  -given low magnesium, prolonged QTc, currently saturating well will hold off diuretics at this time to avoid worsening electrolyte abnormalities

## 2023-05-27 DIAGNOSIS — I27.20 PULMONARY HYPERTENSION, UNSPECIFIED: ICD-10-CM

## 2023-05-27 DIAGNOSIS — Z71.89 OTHER SPECIFIED COUNSELING: ICD-10-CM

## 2023-05-27 LAB
ALBUMIN SERPL ELPH-MCNC: 3.2 G/DL — LOW (ref 3.3–5)
ALP SERPL-CCNC: 88 U/L — SIGNIFICANT CHANGE UP (ref 40–120)
ALT FLD-CCNC: 16 U/L — SIGNIFICANT CHANGE UP (ref 10–45)
ANION GAP SERPL CALC-SCNC: 12 MMOL/L — SIGNIFICANT CHANGE UP (ref 5–17)
ANION GAP SERPL CALC-SCNC: 15 MMOL/L — SIGNIFICANT CHANGE UP (ref 5–17)
AST SERPL-CCNC: 42 U/L — HIGH (ref 10–40)
BILIRUB SERPL-MCNC: 0.4 MG/DL — SIGNIFICANT CHANGE UP (ref 0.2–1.2)
BUN SERPL-MCNC: 10 MG/DL — SIGNIFICANT CHANGE UP (ref 7–23)
BUN SERPL-MCNC: 11 MG/DL — SIGNIFICANT CHANGE UP (ref 7–23)
CALCIUM SERPL-MCNC: 7.4 MG/DL — LOW (ref 8.4–10.5)
CALCIUM SERPL-MCNC: 7.8 MG/DL — LOW (ref 8.4–10.5)
CHLORIDE SERPL-SCNC: 93 MMOL/L — LOW (ref 96–108)
CHLORIDE SERPL-SCNC: 98 MMOL/L — SIGNIFICANT CHANGE UP (ref 96–108)
CO2 SERPL-SCNC: 24 MMOL/L — SIGNIFICANT CHANGE UP (ref 22–31)
CO2 SERPL-SCNC: 28 MMOL/L — SIGNIFICANT CHANGE UP (ref 22–31)
CREAT SERPL-MCNC: 0.54 MG/DL — SIGNIFICANT CHANGE UP (ref 0.5–1.3)
CREAT SERPL-MCNC: 0.54 MG/DL — SIGNIFICANT CHANGE UP (ref 0.5–1.3)
EGFR: 87 ML/MIN/1.73M2 — SIGNIFICANT CHANGE UP
EGFR: 87 ML/MIN/1.73M2 — SIGNIFICANT CHANGE UP
GLUCOSE BLDC GLUCOMTR-MCNC: 110 MG/DL — HIGH (ref 70–99)
GLUCOSE BLDC GLUCOMTR-MCNC: 110 MG/DL — HIGH (ref 70–99)
GLUCOSE BLDC GLUCOMTR-MCNC: 116 MG/DL — HIGH (ref 70–99)
GLUCOSE BLDC GLUCOMTR-MCNC: 129 MG/DL — HIGH (ref 70–99)
GLUCOSE SERPL-MCNC: 128 MG/DL — HIGH (ref 70–99)
GLUCOSE SERPL-MCNC: 143 MG/DL — HIGH (ref 70–99)
HCT VFR BLD CALC: 32.3 % — LOW (ref 34.5–45)
HGB BLD-MCNC: 10.1 G/DL — LOW (ref 11.5–15.5)
MAGNESIUM SERPL-MCNC: 1.5 MG/DL — LOW (ref 1.6–2.6)
MAGNESIUM SERPL-MCNC: 1.6 MG/DL — SIGNIFICANT CHANGE UP (ref 1.6–2.6)
MCHC RBC-ENTMCNC: 24.1 PG — LOW (ref 27–34)
MCHC RBC-ENTMCNC: 31.3 GM/DL — LOW (ref 32–36)
MCV RBC AUTO: 77.1 FL — LOW (ref 80–100)
MRSA PCR RESULT.: SIGNIFICANT CHANGE UP
NRBC # BLD: 0 /100 WBCS — SIGNIFICANT CHANGE UP (ref 0–0)
PHOSPHATE SERPL-MCNC: 2 MG/DL — LOW (ref 2.5–4.5)
PLATELET # BLD AUTO: 315 K/UL — SIGNIFICANT CHANGE UP (ref 150–400)
POTASSIUM SERPL-MCNC: 2.8 MMOL/L — CRITICAL LOW (ref 3.5–5.3)
POTASSIUM SERPL-MCNC: 3.2 MMOL/L — LOW (ref 3.5–5.3)
POTASSIUM SERPL-SCNC: 2.8 MMOL/L — CRITICAL LOW (ref 3.5–5.3)
POTASSIUM SERPL-SCNC: 3.2 MMOL/L — LOW (ref 3.5–5.3)
PROT SERPL-MCNC: 5.7 G/DL — LOW (ref 6–8.3)
RBC # BLD: 4.19 M/UL — SIGNIFICANT CHANGE UP (ref 3.8–5.2)
RBC # FLD: 19.8 % — HIGH (ref 10.3–14.5)
S AUREUS DNA NOSE QL NAA+PROBE: SIGNIFICANT CHANGE UP
SODIUM SERPL-SCNC: 134 MMOL/L — LOW (ref 135–145)
SODIUM SERPL-SCNC: 136 MMOL/L — SIGNIFICANT CHANGE UP (ref 135–145)
WBC # BLD: 8.13 K/UL — SIGNIFICANT CHANGE UP (ref 3.8–10.5)
WBC # FLD AUTO: 8.13 K/UL — SIGNIFICANT CHANGE UP (ref 3.8–10.5)

## 2023-05-27 PROCEDURE — 93970 EXTREMITY STUDY: CPT | Mod: 26

## 2023-05-27 PROCEDURE — 99233 SBSQ HOSP IP/OBS HIGH 50: CPT

## 2023-05-27 RX ORDER — POTASSIUM CHLORIDE 20 MEQ
10 PACKET (EA) ORAL
Refills: 0 | Status: DISCONTINUED | OUTPATIENT
Start: 2023-05-27 | End: 2023-05-27

## 2023-05-27 RX ORDER — POTASSIUM CHLORIDE 20 MEQ
40 PACKET (EA) ORAL EVERY 4 HOURS
Refills: 0 | Status: COMPLETED | OUTPATIENT
Start: 2023-05-27 | End: 2023-05-27

## 2023-05-27 RX ORDER — CHLORHEXIDINE GLUCONATE 213 G/1000ML
1 SOLUTION TOPICAL DAILY
Refills: 0 | Status: DISCONTINUED | OUTPATIENT
Start: 2023-05-27 | End: 2023-06-01

## 2023-05-27 RX ORDER — MAGNESIUM SULFATE 500 MG/ML
1 VIAL (ML) INJECTION ONCE
Refills: 0 | Status: COMPLETED | OUTPATIENT
Start: 2023-05-27 | End: 2023-05-27

## 2023-05-27 RX ORDER — APIXABAN 2.5 MG/1
2.5 TABLET, FILM COATED ORAL EVERY 12 HOURS
Refills: 0 | Status: DISCONTINUED | OUTPATIENT
Start: 2023-05-27 | End: 2023-06-01

## 2023-05-27 RX ADMIN — Medication 40 MILLIEQUIVALENT(S): at 13:25

## 2023-05-27 RX ADMIN — APIXABAN 2.5 MILLIGRAM(S): 2.5 TABLET, FILM COATED ORAL at 17:27

## 2023-05-27 RX ADMIN — Medication 40 MILLIEQUIVALENT(S): at 21:37

## 2023-05-27 RX ADMIN — Medication 100 GRAM(S): at 10:54

## 2023-05-27 RX ADMIN — ATORVASTATIN CALCIUM 10 MILLIGRAM(S): 80 TABLET, FILM COATED ORAL at 21:37

## 2023-05-27 RX ADMIN — Medication 100 GRAM(S): at 17:32

## 2023-05-27 RX ADMIN — Medication 50 MILLIGRAM(S): at 05:21

## 2023-05-27 RX ADMIN — Medication 5 MILLIGRAM(S): at 05:21

## 2023-05-27 RX ADMIN — Medication 40 MILLIEQUIVALENT(S): at 17:28

## 2023-05-27 RX ADMIN — Medication 40 MILLIGRAM(S): at 05:21

## 2023-05-27 RX ADMIN — Medication 325 MILLIGRAM(S): at 11:11

## 2023-05-27 RX ADMIN — CHLORHEXIDINE GLUCONATE 1 APPLICATION(S): 213 SOLUTION TOPICAL at 11:13

## 2023-05-27 NOTE — DIETITIAN INITIAL EVALUATION ADULT - PERTINENT LABORATORY DATA
05-27    134<L>  |  98  |  11  ----------------------------<  128<H>  3.2<L>   |  24  |  0.54    Ca    7.4<L>      27 May 2023 06:11  Phos  2.0     05-27  Mg     1.6     05-27    TPro  5.7<L>  /  Alb  3.2<L>  /  TBili  0.4  /  DBili  x   /  AST  42<H>  /  ALT  16  /  AlkPhos  88  05-27  POCT Blood Glucose.: 110 mg/dL (05-27-23 @ 06:32)

## 2023-05-27 NOTE — PHYSICAL THERAPY INITIAL EVALUATION ADULT - TRANSFER TRAINING, PT EVAL
GOAL: Patient will perform sit to stand transfers with min A at rolling walker with proper hand placement in 2 weeks

## 2023-05-27 NOTE — DIETITIAN INITIAL EVALUATION ADULT - PERTINENT MEDS FT
MEDICATIONS  (STANDING):  atorvastatin 10 milliGRAM(s) Oral at bedtime  chlorhexidine 2% Cloths 1 Application(s) Topical daily  ferrous    sulfate 325 milliGRAM(s) Oral daily  furosemide   Injectable 40 milliGRAM(s) IV Push daily  metoprolol succinate ER 50 milliGRAM(s) Oral daily  predniSONE   Tablet 5 milliGRAM(s) Oral daily    MEDICATIONS  (PRN):  acetaminophen     Tablet .. 650 milliGRAM(s) Oral every 6 hours PRN Temp greater or equal to 38C (100.4F), Mild Pain (1 - 3)  aluminum hydroxide/magnesium hydroxide/simethicone Suspension 30 milliLiter(s) Oral every 4 hours PRN Dyspepsia  melatonin 3 milliGRAM(s) Oral at bedtime PRN Insomnia

## 2023-05-27 NOTE — DIETITIAN INITIAL EVALUATION ADULT - LAB (SPECIFY)
Trend BG levels, renal indices, LFT's, electrolytes and triglycerides. Monitor electrolytes daily and replete PRN.

## 2023-05-27 NOTE — DIETITIAN INITIAL EVALUATION ADULT - PERSON TAUGHT/METHOD
Discussed importance of encouraging adequate consumption of meals/supplements to optimize protein-energy intake; encouraging small/frequent meals, nutrient dense snacks, prioritizing protein foods at meal time. Pt's daughter made aware RD remains available PRN./verbal instruction/daughter instructed

## 2023-05-27 NOTE — DIETITIAN INITIAL EVALUATION ADULT - NSFNSGIIOFT_GEN_A_CORE
Pt's daughter denies pt c/o any current N/V/D; reports pt with chronic constipation. Last BM unknown; no BM documented x admission per flowsheets. No current bowel regimen in place.

## 2023-05-27 NOTE — DIETITIAN INITIAL EVALUATION ADULT - NSFNSNUTRCHEWSWALLOWFT_GEN_A_CORE
Pt's daughter reports pt is edentulous; RD to recommend SLP evaluation to determine appropriate texture/consistency.

## 2023-05-27 NOTE — DIETITIAN INITIAL EVALUATION ADULT - OTHER INFO
- UBW: ~90 pounds   - Dosing wt: 100 pounds (5/25)  - Wt hx per chart: No wt hx available; no new wts available per chart at this time.   - Weight fluctuations possible partially in setting of fluid shifts (pt prescribed diuretic, with moderate edema).   - RD to continue to monitor weight trends as able.   - Nutritionally Pertinent Meds in-house: atorvastatin, ferrous sulfate, lasix, steroid.   - Nutritionally Pertinent Labs: high POCT; high BG; low Na; low K; low Phos.

## 2023-05-27 NOTE — DIETITIAN INITIAL EVALUATION ADULT - PROBLEM SELECTOR PLAN 2
new microcytic anemia with thinning stools, early satiety, concerning for GI pathology, posisbly colon mass  -spoke to patient's family. Dr. Brisa Baker, who prefers conservative diagnostic workup prior to colonooscpy given age.   -iron studies  -CT abdomen/pelvis with oral and Iv contrast.

## 2023-05-27 NOTE — DIETITIAN INITIAL EVALUATION ADULT - REASON
Unable to perform Nutrition Focused Physical Assessment in current setting; pt asleep during time of visit and non-responsive to verbal stimuli. RD will reassess as appropriate.

## 2023-05-27 NOTE — DIETITIAN INITIAL EVALUATION ADULT - REASON INDICATOR FOR ASSESSMENT
Nutrition Consult for pressure injury stage 2 or >.   Source: Pt's daughter via telephone (Hernandez, #[phone]), Electronic Medical Record. Pt asleep during time of visit.   Chart reviewed, events noted.

## 2023-05-27 NOTE — DIETITIAN INITIAL EVALUATION ADULT - OTHER CALCULATIONS
Fluid needs deferred to team.   Estimated needs using dosing wt with consideration for multiple pressure injuries/DTI.

## 2023-05-27 NOTE — DIETITIAN INITIAL EVALUATION ADULT - ENERGY INTAKE
Fair (50-75%) In house, pt's daughter reports pt endorses feeling hungry and consuming meals, but getting full quickly. Pt's daughter reports pt is amenable to oral nutrition supplements.

## 2023-05-27 NOTE — PHYSICAL THERAPY INITIAL EVALUATION ADULT - PERTINENT HX OF CURRENT PROBLEM, REHAB EVAL
Pt is 89yo F PMHx permanent atrial fibrillation, HTN presents with 2 months of worsening lower extremity edema, found to have severe electrolyte abnormalities and sent to ED by PMd. Per patient, has been feeling weaker at home, with worsening SOB with exertion. Pt admitted with adult FTT, microcytic anemia.   5/26 CT A+P Mild ascites, moderate to large plerual effusions. Pt is 89yo F PMHx permanent atrial fibrillation, HTN presents with 2 months of worsening lower extremity edema, found to have severe electrolyte abnormalities and sent to ED by PMd. Per patient, has been feeling weaker at home, with worsening SOB with exertion. Pt admitted with adult FTT, microcytic anemia.   5/26 CT A+P Mild ascites, moderate to large pleural effusions.

## 2023-05-27 NOTE — DIETITIAN INITIAL EVALUATION ADULT - ADD RECOMMEND
1) Continue current diet free of therapeutic restrictions as tolerated to promote adequate PO intake.           - Recommend SLP evaluation to determine appropriate texture/consistency; Defer texture/consistency to SLP/team.   2) Recommend providing Ensure Plus oral nutrition supplements 3x/day to optimize protein-energy intake.   3) Recommend Multivitamin and vitamin C daily to promote wound healing pending no medical contraindications.  4) Continue to monitor PO intake, weight, labs, skin, GI status, and diet.          - Monitor electrolytes daily and replete PRN.           - Consider providing bowel regimen to promote bowel regularity in setting of reported chronic constipation PTA.   5) Nutrition care plan to remain consistent with pt GOC.  6) Honor food preferences as able. RD remains available PRN.

## 2023-05-27 NOTE — DIETITIAN INITIAL EVALUATION ADULT - REASON FOR ADMISSION
Failure to thrive in adult    Per chart: 89 yo female PMHx permanent atrial fibrillation, HTN presents with 2 months of owrsening lower extremity edema, found to have severe electrolyte abnormalities and sent to ED by PMd. Per patietn, has been feeling weaker at home, with worsenin gSOB with exertion. No chest pain. +10 lb weight gain. +early satiety, +thinned stools, no n/v/d/c, no melena or hematochezia. Went to PMD intiially 2 months ago, no workup, went yesterday because worsening, send to ED after labs returned. currently no furhter complaints than above. +Hungry.

## 2023-05-27 NOTE — DIETITIAN INITIAL EVALUATION ADULT - NSFNSPHYEXAMSKINFT_GEN_A_CORE
Per flowsheets:  Pressure Injury 1: sacrum, Suspected deep tissue injury  Pressure Injury 2: heel, Right:, Stage I  Pressure Injury 3: heel, Left:, Stage I

## 2023-05-27 NOTE — DIETITIAN INITIAL EVALUATION ADULT - ORAL INTAKE PTA/DIET HISTORY
Per pt's daughter: pt with fair good appetite and PO intake at baseline PTA; consuming Ensure oral nutrition supplements daily PTA. Denies pt with any known food allergies or intolerances; none noted per pt profile. No micronutrient supplementation PTA documented per H&P. Reports pt with missing teeth, consuming soft/bite sized texture/consistency diet PTA.

## 2023-05-28 LAB
ANION GAP SERPL CALC-SCNC: 10 MMOL/L — SIGNIFICANT CHANGE UP (ref 5–17)
ANION GAP SERPL CALC-SCNC: 13 MMOL/L — SIGNIFICANT CHANGE UP (ref 5–17)
BUN SERPL-MCNC: 11 MG/DL — SIGNIFICANT CHANGE UP (ref 7–23)
BUN SERPL-MCNC: 11 MG/DL — SIGNIFICANT CHANGE UP (ref 7–23)
CALCIUM SERPL-MCNC: 7.4 MG/DL — LOW (ref 8.4–10.5)
CALCIUM SERPL-MCNC: 8 MG/DL — LOW (ref 8.4–10.5)
CHLORIDE SERPL-SCNC: 100 MMOL/L — SIGNIFICANT CHANGE UP (ref 96–108)
CHLORIDE SERPL-SCNC: 96 MMOL/L — SIGNIFICANT CHANGE UP (ref 96–108)
CO2 SERPL-SCNC: 24 MMOL/L — SIGNIFICANT CHANGE UP (ref 22–31)
CO2 SERPL-SCNC: 26 MMOL/L — SIGNIFICANT CHANGE UP (ref 22–31)
CREAT SERPL-MCNC: 0.55 MG/DL — SIGNIFICANT CHANGE UP (ref 0.5–1.3)
CREAT SERPL-MCNC: 0.56 MG/DL — SIGNIFICANT CHANGE UP (ref 0.5–1.3)
EGFR: 86 ML/MIN/1.73M2 — SIGNIFICANT CHANGE UP
EGFR: 86 ML/MIN/1.73M2 — SIGNIFICANT CHANGE UP
GLUCOSE BLDC GLUCOMTR-MCNC: 115 MG/DL — HIGH (ref 70–99)
GLUCOSE BLDC GLUCOMTR-MCNC: 144 MG/DL — HIGH (ref 70–99)
GLUCOSE BLDC GLUCOMTR-MCNC: 151 MG/DL — HIGH (ref 70–99)
GLUCOSE BLDC GLUCOMTR-MCNC: 207 MG/DL — HIGH (ref 70–99)
GLUCOSE SERPL-MCNC: 119 MG/DL — HIGH (ref 70–99)
GLUCOSE SERPL-MCNC: 88 MG/DL — SIGNIFICANT CHANGE UP (ref 70–99)
HCT VFR BLD CALC: 29.3 % — LOW (ref 34.5–45)
HGB BLD-MCNC: 9.2 G/DL — LOW (ref 11.5–15.5)
MAGNESIUM SERPL-MCNC: 2 MG/DL — SIGNIFICANT CHANGE UP (ref 1.6–2.6)
MCHC RBC-ENTMCNC: 23.9 PG — LOW (ref 27–34)
MCHC RBC-ENTMCNC: 31.4 GM/DL — LOW (ref 32–36)
MCV RBC AUTO: 76.1 FL — LOW (ref 80–100)
NRBC # BLD: 0 /100 WBCS — SIGNIFICANT CHANGE UP (ref 0–0)
PHOSPHATE SERPL-MCNC: 1.4 MG/DL — LOW (ref 2.5–4.5)
PLATELET # BLD AUTO: 291 K/UL — SIGNIFICANT CHANGE UP (ref 150–400)
POTASSIUM SERPL-MCNC: 4.6 MMOL/L — SIGNIFICANT CHANGE UP (ref 3.5–5.3)
POTASSIUM SERPL-MCNC: 5.1 MMOL/L — SIGNIFICANT CHANGE UP (ref 3.5–5.3)
POTASSIUM SERPL-SCNC: 4.6 MMOL/L — SIGNIFICANT CHANGE UP (ref 3.5–5.3)
POTASSIUM SERPL-SCNC: 5.1 MMOL/L — SIGNIFICANT CHANGE UP (ref 3.5–5.3)
RBC # BLD: 3.85 M/UL — SIGNIFICANT CHANGE UP (ref 3.8–5.2)
RBC # FLD: 19.9 % — HIGH (ref 10.3–14.5)
SODIUM SERPL-SCNC: 134 MMOL/L — LOW (ref 135–145)
SODIUM SERPL-SCNC: 135 MMOL/L — SIGNIFICANT CHANGE UP (ref 135–145)
WBC # BLD: 7.07 K/UL — SIGNIFICANT CHANGE UP (ref 3.8–10.5)
WBC # FLD AUTO: 7.07 K/UL — SIGNIFICANT CHANGE UP (ref 3.8–10.5)

## 2023-05-28 PROCEDURE — 99497 ADVNCD CARE PLAN 30 MIN: CPT | Mod: 25

## 2023-05-28 PROCEDURE — 99232 SBSQ HOSP IP/OBS MODERATE 35: CPT

## 2023-05-28 RX ADMIN — APIXABAN 2.5 MILLIGRAM(S): 2.5 TABLET, FILM COATED ORAL at 05:30

## 2023-05-28 RX ADMIN — Medication 5 MILLIGRAM(S): at 05:30

## 2023-05-28 RX ADMIN — Medication 50 MILLIGRAM(S): at 05:30

## 2023-05-28 RX ADMIN — APIXABAN 2.5 MILLIGRAM(S): 2.5 TABLET, FILM COATED ORAL at 17:53

## 2023-05-28 RX ADMIN — ATORVASTATIN CALCIUM 10 MILLIGRAM(S): 80 TABLET, FILM COATED ORAL at 21:13

## 2023-05-28 RX ADMIN — Medication 40 MILLIGRAM(S): at 05:30

## 2023-05-28 RX ADMIN — Medication 325 MILLIGRAM(S): at 12:25

## 2023-05-28 RX ADMIN — CHLORHEXIDINE GLUCONATE 1 APPLICATION(S): 213 SOLUTION TOPICAL at 12:25

## 2023-05-28 NOTE — PROGRESS NOTE ADULT - CONVERSATION DETAILS
Followed up discussion from yesterday about diagnosis of severe pulmonary HTN, new microcytic anemia, TTE findings and CT findings with pancreatic lesions. Discussed all possible options from conservative symptom treatment to more aggressive diagnostic options such as right heart catheterization and coloonoscopy.  After discussion with the patient and family members, the goal is to focus on the patient's comfort at this time and not undergo further diagnostic evaluation as will not  goals. Discussed hospice, are willing to have hospice evaluation. Discussed with CM who will place consult.

## 2023-05-29 LAB
ANION GAP SERPL CALC-SCNC: 10 MMOL/L — SIGNIFICANT CHANGE UP (ref 5–17)
BUN SERPL-MCNC: 18 MG/DL — SIGNIFICANT CHANGE UP (ref 7–23)
CALCIUM SERPL-MCNC: 8 MG/DL — LOW (ref 8.4–10.5)
CHLORIDE SERPL-SCNC: 97 MMOL/L — SIGNIFICANT CHANGE UP (ref 96–108)
CHOLEST SERPL-MCNC: 117 MG/DL — SIGNIFICANT CHANGE UP
CO2 SERPL-SCNC: 26 MMOL/L — SIGNIFICANT CHANGE UP (ref 22–31)
CREAT SERPL-MCNC: 0.59 MG/DL — SIGNIFICANT CHANGE UP (ref 0.5–1.3)
EGFR: 85 ML/MIN/1.73M2 — SIGNIFICANT CHANGE UP
GLUCOSE BLDC GLUCOMTR-MCNC: 105 MG/DL — HIGH (ref 70–99)
GLUCOSE BLDC GLUCOMTR-MCNC: 163 MG/DL — HIGH (ref 70–99)
GLUCOSE BLDC GLUCOMTR-MCNC: 88 MG/DL — SIGNIFICANT CHANGE UP (ref 70–99)
GLUCOSE SERPL-MCNC: 80 MG/DL — SIGNIFICANT CHANGE UP (ref 70–99)
HCT VFR BLD CALC: 30.2 % — LOW (ref 34.5–45)
HDLC SERPL-MCNC: 54 MG/DL — SIGNIFICANT CHANGE UP
HGB BLD-MCNC: 9.5 G/DL — LOW (ref 11.5–15.5)
LIPID PNL WITH DIRECT LDL SERPL: 52 MG/DL — SIGNIFICANT CHANGE UP
MAGNESIUM SERPL-MCNC: 1.7 MG/DL — SIGNIFICANT CHANGE UP (ref 1.6–2.6)
MCHC RBC-ENTMCNC: 24.1 PG — LOW (ref 27–34)
MCHC RBC-ENTMCNC: 31.5 GM/DL — LOW (ref 32–36)
MCV RBC AUTO: 76.5 FL — LOW (ref 80–100)
NON HDL CHOLESTEROL: 63 MG/DL — SIGNIFICANT CHANGE UP
NRBC # BLD: 0 /100 WBCS — SIGNIFICANT CHANGE UP (ref 0–0)
PHOSPHATE SERPL-MCNC: 2 MG/DL — LOW (ref 2.5–4.5)
PLATELET # BLD AUTO: 297 K/UL — SIGNIFICANT CHANGE UP (ref 150–400)
POTASSIUM SERPL-MCNC: 4.9 MMOL/L — SIGNIFICANT CHANGE UP (ref 3.5–5.3)
POTASSIUM SERPL-SCNC: 4.9 MMOL/L — SIGNIFICANT CHANGE UP (ref 3.5–5.3)
RBC # BLD: 3.95 M/UL — SIGNIFICANT CHANGE UP (ref 3.8–5.2)
RBC # FLD: 20.1 % — HIGH (ref 10.3–14.5)
SODIUM SERPL-SCNC: 133 MMOL/L — LOW (ref 135–145)
TRIGL SERPL-MCNC: 54 MG/DL — SIGNIFICANT CHANGE UP
WBC # BLD: 6.89 K/UL — SIGNIFICANT CHANGE UP (ref 3.8–10.5)
WBC # FLD AUTO: 6.89 K/UL — SIGNIFICANT CHANGE UP (ref 3.8–10.5)

## 2023-05-29 PROCEDURE — 99232 SBSQ HOSP IP/OBS MODERATE 35: CPT

## 2023-05-29 RX ORDER — FUROSEMIDE 40 MG
40 TABLET ORAL DAILY
Refills: 0 | Status: DISCONTINUED | OUTPATIENT
Start: 2023-05-29 | End: 2023-06-01

## 2023-05-29 RX ADMIN — Medication 40 MILLIGRAM(S): at 05:10

## 2023-05-29 RX ADMIN — APIXABAN 2.5 MILLIGRAM(S): 2.5 TABLET, FILM COATED ORAL at 05:10

## 2023-05-29 RX ADMIN — CHLORHEXIDINE GLUCONATE 1 APPLICATION(S): 213 SOLUTION TOPICAL at 11:03

## 2023-05-29 RX ADMIN — Medication 50 MILLIGRAM(S): at 05:10

## 2023-05-29 RX ADMIN — Medication 325 MILLIGRAM(S): at 11:03

## 2023-05-29 RX ADMIN — Medication 5 MILLIGRAM(S): at 05:11

## 2023-05-29 RX ADMIN — APIXABAN 2.5 MILLIGRAM(S): 2.5 TABLET, FILM COATED ORAL at 17:44

## 2023-05-29 RX ADMIN — ATORVASTATIN CALCIUM 10 MILLIGRAM(S): 80 TABLET, FILM COATED ORAL at 22:14

## 2023-05-30 LAB
ANION GAP SERPL CALC-SCNC: 13 MMOL/L — SIGNIFICANT CHANGE UP (ref 5–17)
BUN SERPL-MCNC: 21 MG/DL — SIGNIFICANT CHANGE UP (ref 7–23)
CALCIUM SERPL-MCNC: 8.4 MG/DL — SIGNIFICANT CHANGE UP (ref 8.4–10.5)
CHLORIDE SERPL-SCNC: 96 MMOL/L — SIGNIFICANT CHANGE UP (ref 96–108)
CO2 SERPL-SCNC: 28 MMOL/L — SIGNIFICANT CHANGE UP (ref 22–31)
CREAT SERPL-MCNC: 0.65 MG/DL — SIGNIFICANT CHANGE UP (ref 0.5–1.3)
EGFR: 83 ML/MIN/1.73M2 — SIGNIFICANT CHANGE UP
GLUCOSE BLDC GLUCOMTR-MCNC: 108 MG/DL — HIGH (ref 70–99)
GLUCOSE BLDC GLUCOMTR-MCNC: 128 MG/DL — HIGH (ref 70–99)
GLUCOSE BLDC GLUCOMTR-MCNC: 128 MG/DL — HIGH (ref 70–99)
GLUCOSE BLDC GLUCOMTR-MCNC: 141 MG/DL — HIGH (ref 70–99)
GLUCOSE SERPL-MCNC: 78 MG/DL — SIGNIFICANT CHANGE UP (ref 70–99)
HCT VFR BLD CALC: 29.8 % — LOW (ref 34.5–45)
HGB BLD-MCNC: 9.3 G/DL — LOW (ref 11.5–15.5)
MCHC RBC-ENTMCNC: 24 PG — LOW (ref 27–34)
MCHC RBC-ENTMCNC: 31.2 GM/DL — LOW (ref 32–36)
MCV RBC AUTO: 76.8 FL — LOW (ref 80–100)
NRBC # BLD: 0 /100 WBCS — SIGNIFICANT CHANGE UP (ref 0–0)
PLATELET # BLD AUTO: 320 K/UL — SIGNIFICANT CHANGE UP (ref 150–400)
POTASSIUM SERPL-MCNC: 3.9 MMOL/L — SIGNIFICANT CHANGE UP (ref 3.5–5.3)
POTASSIUM SERPL-SCNC: 3.9 MMOL/L — SIGNIFICANT CHANGE UP (ref 3.5–5.3)
RBC # BLD: 3.88 M/UL — SIGNIFICANT CHANGE UP (ref 3.8–5.2)
RBC # FLD: 20.1 % — HIGH (ref 10.3–14.5)
SODIUM SERPL-SCNC: 137 MMOL/L — SIGNIFICANT CHANGE UP (ref 135–145)
WBC # BLD: 6.19 K/UL — SIGNIFICANT CHANGE UP (ref 3.8–10.5)
WBC # FLD AUTO: 6.19 K/UL — SIGNIFICANT CHANGE UP (ref 3.8–10.5)

## 2023-05-30 PROCEDURE — 99221 1ST HOSP IP/OBS SF/LOW 40: CPT

## 2023-05-30 PROCEDURE — 99232 SBSQ HOSP IP/OBS MODERATE 35: CPT

## 2023-05-30 RX ORDER — POTASSIUM CHLORIDE 20 MEQ
40 PACKET (EA) ORAL ONCE
Refills: 0 | Status: COMPLETED | OUTPATIENT
Start: 2023-05-30 | End: 2023-05-30

## 2023-05-30 RX ADMIN — Medication 50 MILLIGRAM(S): at 05:50

## 2023-05-30 RX ADMIN — Medication 325 MILLIGRAM(S): at 11:36

## 2023-05-30 RX ADMIN — Medication 40 MILLIGRAM(S): at 05:50

## 2023-05-30 RX ADMIN — APIXABAN 2.5 MILLIGRAM(S): 2.5 TABLET, FILM COATED ORAL at 17:08

## 2023-05-30 RX ADMIN — Medication 40 MILLIEQUIVALENT(S): at 18:40

## 2023-05-30 RX ADMIN — ATORVASTATIN CALCIUM 10 MILLIGRAM(S): 80 TABLET, FILM COATED ORAL at 21:01

## 2023-05-30 RX ADMIN — CHLORHEXIDINE GLUCONATE 1 APPLICATION(S): 213 SOLUTION TOPICAL at 11:36

## 2023-05-30 RX ADMIN — APIXABAN 2.5 MILLIGRAM(S): 2.5 TABLET, FILM COATED ORAL at 05:50

## 2023-05-30 RX ADMIN — Medication 5 MILLIGRAM(S): at 05:50

## 2023-05-30 NOTE — CONSULT NOTE ADULT - ASSESSMENT
Impression:    Sacral/bilateral Buttocks deep tissue injury present on admission  Incontinence of bowel and bladder  Incontinence Dermatitis    Recommend:  1.) topical therapy: sacral/buttock injury - cleanse with incontinence cleanser, pat dry, apply allevyn foam dressing every other day  2.) Incontinence Management - incontinence cleanser, pads, pericare BID, continue external female urinary catheter  3.) Maintain on an alternating air with low air loss surface  4.) Turn and reposition Q 2 hours  5.) Nutrition optimization - please add shine  6.) Offload heels/feet with complete cair air fluidized boots; ensure that the soles of the feet are not resting on the foot board of the bed.  7.) Chair cushion for chair sitting    Care as per medicine. Will not actively follow but will remain available. Please recall for new issues or deterioration.  Upon [facility] [address] [place] [address] [phone]  Thank you for this consult  Seen and discussed with clinical nurse  Lupe Bowie, BLADIMIR-C, CWOCN via Teams

## 2023-05-30 NOTE — CONSULT NOTE ADULT - SUBJECTIVE AND OBJECTIVE BOX
Wound Surgery Consult Note:    HPI:  89 yo female PMHx permanent atrial fibrillation, HTN presents with 2 months of owrsening lower extremity edema, found to have severe electrolyte abnormalities and sent to ED by PMd. Per patietn, has been feeling weaker at home, with worsenin gSOB with exertion. No chest pain. +10 lb weight gain. +early satiety, +thinned stools, no n/v/d/c, no melena or hematochezia. Went to PMD intiially 2 months ago, no workup, went yesterday because worsening, send to ED after labs returned. currently no furhter complaints than above. +Hungry.    On collateral information from patient's PMD, last colonoscopy was in 2007, TTE was 2018, CBC in 1/2023 11.7 with MCV 89. (26 May 2023 09:30)    Request for wound care consult for sacral/bilateral buttocks skin breakdown received from nursing. Ms. Swanson was encountered sitting up in a reclining chair. She has been maintained on an alternating air with low air loss surface. She is incontinent of stool and urine.  Her extreme immobility, inactivity, incontinence of stool and urine as well as poor nutritional status all contribute to her high risk for pressure injury development and hinder healing. Identification of the initial signs of deep tissue damage at the level of the skin within 72 hours of admission make this an injury that occurred prior to admission.    Principles of pressure injury prevention and treatment including but not limited to offloading and turning and repositioning review with patient.     PAST MEDICAL & SURGICAL HISTORY:  Hypertension, unspecified type  Atrial fibrillation, unspecified type  No significant past surgical history    REVIEW OF SYSTEMS:    Constitutional:     [ ] negative [ ] fevers [ ] chills [ ] weight loss [ ] weight gain  [x ] fatigue  HEENT:                  [x ] negative [ ] dry eyes [ ] eye irritation [ ] postnasal drip [ ] nasal congestion   CV:                         [x ] negative  [ ] chest pain [ ] orthopnea [ ] palpitations [ ] tachycardia  Resp:                     [ x] negative [ ] cough [ ] shortness of breath [ ] dyspnea [ ] wheezing [ ] sputum [ ] hemoptysis  GI:                          [ ] negative [ ] nausea [ ] vomiting [ ] diarrhea [ ] constipation [ ] abd pain [ ] dysphagia [x ] incontinent of bowel  :                        [ ] negative [ ] dysuria [ ] nocturia [ ] hematuria [ ] increased urinary frequency [ x] incontinent of urine  Musculoskeletal:     [ ] negative [ ] back pain [ ] myalgias [ ] arthralgias [ ] fracture [x ] ambulatory  [ ] non-ambulatory  Skin:                       [ ] negative [ ] rash [ ] itch [x ] wound [ ] skin discoloration  Neurological:        [x ] negative [ ] headache [ ] dizziness [ ] syncope [x ] weakness [ ] numbness  Psychiatric:           [x ] negative [ ] anxiety [ ] depression  Endocrine:            [x ] negative [ ] diabetes [ ] thyroid problem  Heme/Lymph:      [ ] negative [x ] anemia [ ] bleeding problem  Allergic/Immune: [x ] negative [ ] itchy eyes [ ] nasal discharge [ ] hives [ ] angioedema    [x ] All other systems negative    MEDICATIONS  (STANDING):  apixaban 2.5 milliGRAM(s) Oral every 12 hours  atorvastatin 10 milliGRAM(s) Oral at bedtime  chlorhexidine 2% Cloths 1 Application(s) Topical daily  ferrous    sulfate 325 milliGRAM(s) Oral daily  furosemide    Tablet 40 milliGRAM(s) Oral daily  metoprolol succinate ER 50 milliGRAM(s) Oral daily  predniSONE   Tablet 5 milliGRAM(s) Oral daily    MEDICATIONS  (PRN):  acetaminophen     Tablet .. 650 milliGRAM(s) Oral every 6 hours PRN Temp greater or equal to 38C (100.4F), Mild Pain (1 - 3)  aluminum hydroxide/magnesium hydroxide/simethicone Suspension 30 milliLiter(s) Oral every 4 hours PRN Dyspepsia  melatonin 3 milliGRAM(s) Oral at bedtime PRN Insomnia    Allergies    penicillin (Unknown)    Intolerances    SOCIAL HISTORY:  , Denies smoking, ETOH, drugs    FAMILY HISTORY:  No pertinent family history in first degree relatives    Vital Signs Last 24 Hrs  T(C): 36.5 (30 May 2023 11:32), Max: 37 (29 May 2023 20:07)  T(F): 97.7 (30 May 2023 11:32), Max: 98.6 (29 May 2023 20:07)  HR: 79 (30 May 2023 11:32) (79 - 89)  BP: 132/78 (30 May 2023 11:32) (132/78 - 147/87)  BP(mean): --  RR: 18 (30 May 2023 11:32) (18 - 18)  SpO2: 95% (30 May 2023 11:32) (95% - 98%)    Parameters below as of 30 May 2023 11:32  Patient On (Oxygen Delivery Method): room air    Physical Exam:  General: Alert, WN  Ophthamology: sclera clear  ENMT: moist mucous membranes, trachea midline  Respiratory: equal chest rise with respirations  Gastrointestinal: soft NT/ND  Neurology: verbal, following commands  Psych: calm, appropriate  Musculoskeletal: no contractures  Vascular: BLE edema equal  Skin:  Sacral/bilateral buttocks with dull maroon discolored skin in and around the gluteal cleft with edematous appearance and central superficially denuded area L 3cm X W 3cm x D 0.1cm, scant serosanguinous drainage  No odor, erythema, increased warmth, tenderness, induration, fluctuance    LABS:  05-30    137  |  96  |  21  ----------------------------<  78  3.9   |  28  |  0.65    Ca    8.4      30 May 2023 06:45  Phos  2.0     05-29  Mg     1.7     05-29                            9.3    6.19  )-----------( 320      ( 30 May 2023 06:45 )             29.8       RADIOLOGY & ADDITIONAL STUDIES:    EXAM:  CT ABDOMEN AND PELVIS OC IC   ORDERED BY: EDWIN CHAIDEZ     PROCEDURE DATE:  05/26/2023      INTERPRETATION:  CLINICAL INFORMATION: r/o mass Admitting Dxs: R62.7   ABNORMAL LABS MCT    COMPARISON: None.    CONTRAST/COMPLICATIONS:  IV Contrast: Omnipaque 350  75 cc administered   25 cc discarded  Oral Contrast: Omnipaque 300  Complications: None reported at time of study completion    PROCEDURE:  CT of the Abdomen and Pelvis was performed.  Sagittal and coronal reformats were performed.    FINDINGS:    LOWER CHEST: Right lower lobe calcifications. Moderate to large bilateral   pleural effusions. Cardiomegaly.  LIVER: Within normal limits.  BILE DUCTS: Normal caliber.  GALLBLADDER: Extrahepatic location of the gallbladder is noted.  SPLEEN: Within normal limits.  PANCREAS: Suggestion of a cyst in the pancreatic body. Questionable 1.4   cm pancreatic body/tail lesion on coronal image 48.  ADRENALS: Within normal limits.  KIDNEYS/URETERS: Within normal limits.    BLADDER: Within normal limits.  REPRODUCTIVE ORGANS: Within normal limits.    BOWEL: No bowel obstruction.  PERITONEUM: Mild ascites.  VESSELS:  Within normal limits.  RETROPERITONEUM/LYMPH NODES: No lymphadenopathy.  ABDOMINAL WALL: Anasarca.  BONES: T11 compression fracture.    IMPRESSION: Mild ascites. Moderate to large pleural effusions.    Questionable 1.4 cm pancreatic body/tail lesion.

## 2023-05-31 LAB
GLUCOSE BLDC GLUCOMTR-MCNC: 102 MG/DL — HIGH (ref 70–99)
GLUCOSE BLDC GLUCOMTR-MCNC: 108 MG/DL — HIGH (ref 70–99)
GLUCOSE BLDC GLUCOMTR-MCNC: 133 MG/DL — HIGH (ref 70–99)
GLUCOSE BLDC GLUCOMTR-MCNC: 95 MG/DL — SIGNIFICANT CHANGE UP (ref 70–99)

## 2023-05-31 PROCEDURE — 99232 SBSQ HOSP IP/OBS MODERATE 35: CPT

## 2023-05-31 RX ADMIN — CHLORHEXIDINE GLUCONATE 1 APPLICATION(S): 213 SOLUTION TOPICAL at 16:20

## 2023-05-31 RX ADMIN — APIXABAN 2.5 MILLIGRAM(S): 2.5 TABLET, FILM COATED ORAL at 17:35

## 2023-05-31 RX ADMIN — ATORVASTATIN CALCIUM 10 MILLIGRAM(S): 80 TABLET, FILM COATED ORAL at 21:25

## 2023-05-31 RX ADMIN — Medication 40 MILLIGRAM(S): at 05:17

## 2023-05-31 RX ADMIN — Medication 325 MILLIGRAM(S): at 16:20

## 2023-05-31 RX ADMIN — Medication 50 MILLIGRAM(S): at 05:17

## 2023-05-31 RX ADMIN — Medication 5 MILLIGRAM(S): at 05:17

## 2023-05-31 RX ADMIN — APIXABAN 2.5 MILLIGRAM(S): 2.5 TABLET, FILM COATED ORAL at 05:17

## 2023-05-31 NOTE — PROGRESS NOTE ADULT - PROBLEM SELECTOR PLAN 2
DANELLE  -ct without colon mass  -no plans for colonoscopy after conversatoin with family.   -Ferrous sulfate for now.   -Hb stable.   -restart eliquis per family wishes, understand risk of bleed, but want to prevent CVA.
DANELLE  -ct without colon mass  -no plans for colonoscopy after conversatoin with family.   -Ferrous sulfate for now.   -Hb stable.   -restart eliquis per family wishes, understand risk of bleed, but want to prevent CVA.
DANELLE  -ct without colon mass  -next step would be colonoscopy, please see above re: agressiveness. Family to decide.   -Ferrous sulfate for now.   -Hb stable.   -restart eliquis per family wishes, understand risk of bleed, but want to prevent CVA.
DANELLE  -ct without colon mass  -no plans for colonoscopy after conversation with family.   -Ferrous sulfate for now.   -Hb stable.   -restart eliquis per family wishes, understand risk of bleed, but want to prevent CVA.
DANELLE  -ct without colon mass  -no plans for colonoscopy after conversation with family.   -Ferrous sulfate for now.   -Hb stable.   -restart eliquis per family wishes, understand risk of bleed, but want to prevent CVA.

## 2023-05-31 NOTE — PROGRESS NOTE ADULT - NSPROGADDITIONALINFOA_GEN_ALL_CORE
Updated daughter Surjit. Dispo planning for home hospice    d/w ACP  Abril Davidson MD  Division of Hospital Medicine  Available on [employer] Teams
dispo planning.    d/w ACP    Abril Davidson MD  Division of Hospital Medicine  Available on [employer] Teams

## 2023-05-31 NOTE — PROGRESS NOTE ADULT - PROBLEM SELECTOR PLAN 3
in setting of above.
in setting of abvoe.
in setting of above.

## 2023-05-31 NOTE — PROGRESS NOTE ADULT - ASSESSMENT
89 yo female PMHx HTN, Permanent Atrial fibrillation admitted with failure ot thrive c/b severe pulmonary HTN, new microcytic anemia
89 yo female PMHx HTN, Permanent Atrial fibrillation admitted with failure ot thrive c/b severe pulmonary HTN, new microcytic anemia
Assessment/plan:    Left heel early DTI: Stable, noninfected, present on admission.    Recommend continued cubitus precautions and use of Z flow boots.  Recommend cavilon and Allevyn foam dressing every other day to left heel.  Will continue to monitor for signs of infection and wound care recommendations while in house
89 yo female PMHx HTN, Permanent Atrial fibrillation admitted with failure ot thrive c/b severe pulmonary HTN, new microcytic anemia
89 yo female PMHx HTN, Permanent Atrial fibrillation admitted with failure ot thrive c/b severe pulmonary HTN, new microcytic anemia
89 yo female PMHx HTN, Permanent Atrial fibrillation admitted with failure ot thrive c/b severe pulmonary HTN, new microcytic anemia

## 2023-05-31 NOTE — PROGRESS NOTE ADULT - PROBLEM SELECTOR PLAN 4
repleted.
aggressively replete electrolytes.   -BID BMP
agressively replete electrolytes.   -BID BMP
repleted.
repleted.

## 2023-05-31 NOTE — PROGRESS NOTE ADULT - REASON FOR ADMISSION
failure to thrive

## 2023-05-31 NOTE — PROGRESS NOTE ADULT - PROBLEM SELECTOR PLAN 1
PAtient with pulm pressures of 95, RV overload.  -continue with diuretics  -discussed at length with patient's daughter-in-law and great-niece (Dr. Baker) regarding potential workup, including V/q scan, duplex LE edema and eventual RHC, but extent of workup and invasiveness will be dependent on goals of care for patient. Also discussed that given age, treatment for severe pulmonary HTN will likely not change regardless of class.   -will discuss and will re-convene for family meeting on 5/28.
PAtient with pulm pressures of 95, RV overload.  -continue with diuretics  -duplex negative-]  -no further diagnostic testing after GOC conversatoin.   -transition to lasix 40mg po on 5/29, can stay on for discharge.
PAtient with pulm pressures of 95, RV overload.  -continue with diuretics  -duplex negative-]  -no further diagnostic testing after GOC conversatoin.   -transition to po diuretics next 24-48 hours.
PAtient with pulm pressures of 95, RV overload.  -continue with diuretics  -duplex negative-]  -no further diagnostic testing after GOC conversation.   -transition to lasix 40mg po on 5/29, can stay on for discharge.
PAtient with pulm pressures of 95, RV overload.  -continue with diuretics  -duplex negative-]  -no further diagnostic testing after GOC conversatoin.   -transition to lasix 40mg po on 5/29, can stay on for discharge.

## 2023-05-31 NOTE — PROGRESS NOTE ADULT - PROBLEM SELECTOR PROBLEM 3
Adult failure to thrive

## 2023-05-31 NOTE — PROGRESS NOTE ADULT - PROBLEM SELECTOR PLAN 5
continue metoprolol  -restart A/C. r/b/a discussed with patient.   trend Hb

## 2023-05-31 NOTE — PROGRESS NOTE ADULT - PROBLEM SELECTOR PLAN 6
eliquis restarted    #GOC  -DNR/DNI. Hospice eval    #Prednisone use.  -family unclear why on chronic prednisone.   -start PPI given anemia, unclear if IG source, chronic steroid.   -taper if possible depending on reason she is on it.
eliquis restarted    #GOC  -DNR/DNI. Hospice eval    #Prednisone use.  -family unclear why on chronic prednisone.   -start PPI given anemia, unclear if IG source, chronic steroid.   -taper if possible depending on reason she is on it.
eliquis restarted    #C  -DNR/DNI. Family meeting to on 5/28.    #Prednisone use.  -family unclear why on chronic prednisone.   -start PPI given anemia, unclear if IG source, chronic steroid.   -taper if possible depending on reason she is on it.
eliquis restarted    #GOC  -DNR/DNI. Malik gimenez    #Prednisone use.  -family unclear why on chronic prednisone.   -start PPI given anemia, unclear if IG source, chronic steroid.   -taper if possible depending on reason she is on it.
eliquis restarted    #GOC  -DNR/DNI. Hospice eval    #Prednisone use.  -family unclear why on chronic prednisone.   -start PPI given anemia  -taper if possible depending on reason she is on it.

## 2023-06-01 ENCOUNTER — TRANSCRIPTION ENCOUNTER (OUTPATIENT)
Age: 88
End: 2023-06-01

## 2023-06-01 VITALS
HEART RATE: 95 BPM | DIASTOLIC BLOOD PRESSURE: 72 MMHG | TEMPERATURE: 98 F | SYSTOLIC BLOOD PRESSURE: 106 MMHG | RESPIRATION RATE: 18 BRPM | OXYGEN SATURATION: 99 %

## 2023-06-01 LAB
GLUCOSE BLDC GLUCOMTR-MCNC: 101 MG/DL — HIGH (ref 70–99)
GLUCOSE BLDC GLUCOMTR-MCNC: 186 MG/DL — HIGH (ref 70–99)
GLUCOSE BLDC GLUCOMTR-MCNC: 89 MG/DL — SIGNIFICANT CHANGE UP (ref 70–99)

## 2023-06-01 PROCEDURE — 99239 HOSP IP/OBS DSCHRG MGMT >30: CPT

## 2023-06-01 PROCEDURE — 83550 IRON BINDING TEST: CPT

## 2023-06-01 PROCEDURE — 97116 GAIT TRAINING THERAPY: CPT

## 2023-06-01 PROCEDURE — 99285 EMERGENCY DEPT VISIT HI MDM: CPT

## 2023-06-01 PROCEDURE — 85025 COMPLETE CBC W/AUTO DIFF WBC: CPT

## 2023-06-01 PROCEDURE — 87640 STAPH A DNA AMP PROBE: CPT

## 2023-06-01 PROCEDURE — 97162 PT EVAL MOD COMPLEX 30 MIN: CPT

## 2023-06-01 PROCEDURE — 85045 AUTOMATED RETICULOCYTE COUNT: CPT

## 2023-06-01 PROCEDURE — 82962 GLUCOSE BLOOD TEST: CPT

## 2023-06-01 PROCEDURE — 84480 ASSAY TRIIODOTHYRONINE (T3): CPT

## 2023-06-01 PROCEDURE — 84436 ASSAY OF TOTAL THYROXINE: CPT

## 2023-06-01 PROCEDURE — 87086 URINE CULTURE/COLONY COUNT: CPT

## 2023-06-01 PROCEDURE — 84450 TRANSFERASE (AST) (SGOT): CPT

## 2023-06-01 PROCEDURE — 97530 THERAPEUTIC ACTIVITIES: CPT

## 2023-06-01 PROCEDURE — 74177 CT ABD & PELVIS W/CONTRAST: CPT

## 2023-06-01 PROCEDURE — 83735 ASSAY OF MAGNESIUM: CPT

## 2023-06-01 PROCEDURE — 80053 COMPREHEN METABOLIC PANEL: CPT

## 2023-06-01 PROCEDURE — 80048 BASIC METABOLIC PNL TOTAL CA: CPT

## 2023-06-01 PROCEDURE — 87641 MR-STAPH DNA AMP PROBE: CPT

## 2023-06-01 PROCEDURE — 93970 EXTREMITY STUDY: CPT

## 2023-06-01 PROCEDURE — 83540 ASSAY OF IRON: CPT

## 2023-06-01 PROCEDURE — 84443 ASSAY THYROID STIM HORMONE: CPT

## 2023-06-01 PROCEDURE — 36415 COLL VENOUS BLD VENIPUNCTURE: CPT

## 2023-06-01 PROCEDURE — 84484 ASSAY OF TROPONIN QUANT: CPT

## 2023-06-01 PROCEDURE — 83880 ASSAY OF NATRIURETIC PEPTIDE: CPT

## 2023-06-01 PROCEDURE — 81001 URINALYSIS AUTO W/SCOPE: CPT

## 2023-06-01 PROCEDURE — 82728 ASSAY OF FERRITIN: CPT

## 2023-06-01 PROCEDURE — 84100 ASSAY OF PHOSPHORUS: CPT

## 2023-06-01 PROCEDURE — 80061 LIPID PANEL: CPT

## 2023-06-01 PROCEDURE — 85027 COMPLETE CBC AUTOMATED: CPT

## 2023-06-01 PROCEDURE — 93306 TTE W/DOPPLER COMPLETE: CPT

## 2023-06-01 RX ORDER — FERROUS SULFATE 325(65) MG
1 TABLET ORAL
Qty: 30 | Refills: 0
Start: 2023-06-01 | End: 2023-06-30

## 2023-06-01 RX ORDER — CLOPIDOGREL BISULFATE 75 MG/1
1 TABLET, FILM COATED ORAL
Qty: 0 | Refills: 0 | DISCHARGE

## 2023-06-01 RX ORDER — FERROUS SULFATE 325(65) MG
1 TABLET ORAL
Qty: 0 | Refills: 0 | DISCHARGE
Start: 2023-06-01

## 2023-06-01 RX ORDER — FUROSEMIDE 40 MG
1 TABLET ORAL
Qty: 30 | Refills: 0
Start: 2023-06-01 | End: 2023-06-30

## 2023-06-01 RX ADMIN — APIXABAN 2.5 MILLIGRAM(S): 2.5 TABLET, FILM COATED ORAL at 05:36

## 2023-06-01 RX ADMIN — Medication 40 MILLIGRAM(S): at 05:36

## 2023-06-01 RX ADMIN — CHLORHEXIDINE GLUCONATE 1 APPLICATION(S): 213 SOLUTION TOPICAL at 11:53

## 2023-06-01 RX ADMIN — Medication 5 MILLIGRAM(S): at 05:36

## 2023-06-01 RX ADMIN — Medication 50 MILLIGRAM(S): at 05:36

## 2023-06-01 RX ADMIN — Medication 325 MILLIGRAM(S): at 11:53

## 2023-06-01 NOTE — DISCHARGE NOTE PROVIDER - HOSPITAL COURSE
89 yo female PMHx HTN, Permanent Atrial fibrillation admitted with failure ot thrive c/b severe pulmonary HTN, new microcytic anemia: Patient with pulm pressures of 95, RV overload.Continued with current diuretics.Duplex negative for DVT.No further diagnostic testing after GOC conversation. Transition to lasix 40mg po on 5/29, can stay on for discharge.Patient has Microcytic anemia. CT without colon mass,no plans for colonoscopy after conversation with family. Continue on Ferrous sulfate for now. Hb stable. restarted eliquis per family wishes, understand risk of bleed, but want to prevent CVA.History of atrial fibrillation.Continue metoprolol ,restart A/C r/b/a discussed with patient.   GOC discussed with family, patient is DNR/DNI. Hospice eval completed.family unclear why on chronic prednisone.,started on PPI given anemia  taper if possible depending on reason she is on it.  Left heel early DTI: Stable, noninfected, present on admission.Podiatry and wound care consulted.Recommend continued cubitus precautions and use of Z flow boots, cavilon and Allevyn foam dressing every other day to left heel.  patient noted to have Sacral/bilateral Buttocks deep tissue injury on admission ,wound care consulted.  Recommend:Topical therapy: sacral/buttock injury - cleanse with incontinence cleanser, pat dry, apply allevyn foam dressing every other day, offload heels/feet with complete  air fluidized boots; ensure that the soles of the feet are not resting on the foot board of the bed.  Upon discharge [facility] at [facility] [address] [place] [address] [phone]    Medically cleared for discharge by medical attending with follow up as advised.       89 yo female PMHx HTN, Permanent Atrial fibrillation admitted with failure ot thrive c/b severe pulmonary HTN, new microcytic anemia: Patient with pulm pressures of 95, RV overload.Continued with current diuretics. Duplex negative for DVT.No further diagnostic testing after GOC conversation. Transition to lasix 40mg po on 5/29, can stay on for discharge.Patient has Microcytic anemia. CT without colon mass,no plans for colonoscopy after conversation with family. Continue on Ferrous sulfate for now. Hb stable. restarted eliquis per family wishes, understand risk of bleed, but want to prevent CVA.History of atrial fibrillation.Continue metoprolol ,restart A/C r/b/a discussed with patient.   GOC discussed with family, patient is DNR/DNI. Hospice eval completed.   Left heel early DTI: Stable, noninfected, present on admission.Podiatry and wound care consulted.Recommend continued cubitus precautions and use of Z flow boots, cavilon and Allevyn foam dressing every other day to left heel.  patient noted to have Sacral/bilateral Buttocks deep tissue injury on admission ,wound care consulted.  Recommend:Topical therapy: sacral/buttock injury - cleanse with incontinence cleanser, pat dry, apply allevyn foam dressing every other day, offload heels/feet with complete  air fluidized boots; ensure that the soles of the feet are not resting on the foot board of the bed.  Upon discharge f/u as outpatient at [facility] [address] [facility] [phone]    Medically cleared for discharge by medical attending with follow up as advised.       89 yo female PMHx HTN, Permanent Atrial fibrillation admitted with failure ot thrive c/b severe pulmonary HTN, new microcytic anemia: Patient with pulm pressures of 95, RV overload. Continued with current diuretics. Duplex negative for DVT. No further diagnostic testing after GOC conversation. Transition to lasix 40mg po on 5/29, can stay on for discharge. Patient has Microcytic anemia. CT without colon mass, no plans for colonoscopy after conversation with family. Continue on Ferrous sulfate for now. Hb stable. Restarted eliquis per family wishes, understand risk of bleed, but want to prevent CVA. History of atrial fibrillation. Continue metoprolol, restart A/C r/b/a discussed with patient.   GOC discussed with family, patient is DNR/DNI. Hospice eval completed.   Left heel early DTI: Stable, noninfected, present on admission. Podiatry and wound care consulted. Recommend continued cubitus precautions and use of Z flow boots, cavilon and Allevyn foam dressing every other day to left heel.  Patient noted to have Sacral/bilateral Buttocks deep tissue injury on admission, wound care consulted.  Recommend: Topical therapy: sacral/buttock injury - cleanse with incontinence cleanser, pat dry, apply allevyn foam dressing every other day, offload heels/feet with complete  air fluidized boots; ensure that the soles of the feet are not resting on the foot board of the bed.  Upon discharge f/u as outpatient at [facility] [address] [facility] [phone]    Medically cleared for discharge by medical attending with follow up as advised.

## 2023-06-01 NOTE — DISCHARGE NOTE PROVIDER - NSDCFUADDAPPT_GEN_ALL_CORE_FT
APPTS ARE READY TO BE MADE: [ ] YES    Best Family or Patient Contact (if needed):    Additional Information about above appointments (if needed):    1:   2:   3:     Other comments or requests:    Please follow up with your primary care doctor    APPTS ARE READY TO BE MADE: [ ] YES    Best Family or Patient Contact (if needed):    Additional Information about above appointments (if needed):    1:   2:   3:     Other comments or requests:

## 2023-06-01 NOTE — DISCHARGE NOTE NURSING/CASE MANAGEMENT/SOCIAL WORK - NSDCFUADDAPPT_GEN_ALL_CORE_FT
Please follow up with your primary care doctor    APPTS ARE READY TO BE MADE: [ ] YES    Best Family or Patient Contact (if needed):    Additional Information about above appointments (if needed):    1:   2:   3:     Other comments or requests:

## 2023-06-01 NOTE — DISCHARGE NOTE PROVIDER - NSDCMRMEDTOKEN_GEN_ALL_CORE_FT
ascorbic acid 500 mg oral tablet: 1 tab(s) orally once a day  calcium (as carbonate)-vitamin D 600 mg-400 intl units (10 mcg) oral tablet: 1 tab(s) orally 2 times a day  clindamycin 300 mg oral capsule: 1 cap(s) orally 3 times a day   Colace 100 mg oral capsule: 1 orally 3 to 4 caps once a day  Daily Kateryna oral tablet: 1 tab(s) orally once a day  Eliquis 2.5 mg oral tablet: 1 tab(s) orally 2 times a day  enalapril 5 mg oral tablet: 1 tab(s) orally 2 times a day  ergocalciferol 1.25 mg (50,000 intl units) oral capsule: 1 cap(s) orally once a week  Lipitor 10 mg oral tablet: 1 tab(s) orally once a day  Plavix 75 mg oral tablet: 1 tab(s) orally once a day  predniSONE 5 mg oral tablet: 1 tab(s) orally once a day   Toprol-XL 50 mg oral tablet, extended release: 1 tab(s) orally once a day  Zenpep 20,000 units-63,000 units-84,000 units oral delayed release capsule: 1 cap(s) orally 2 times a day   ascorbic acid 500 mg oral tablet: 1 tab(s) orally once a day  Colace 100 mg oral capsule: 1 orally 3 to 4 caps once a day  Daily Kateryna oral tablet: 1 tab(s) orally once a day  Eliquis 2.5 mg oral tablet: 1 tab(s) orally 2 times a day  ergocalciferol 1.25 mg (50,000 intl units) oral capsule: 1 cap(s) orally once a week  ferrous sulfate 325 mg (65 mg elemental iron) oral tablet: 1 tab(s) orally once a day  furosemide 40 mg oral tablet: 1 tab(s) orally once a day  Lipitor 10 mg oral tablet: 1 tab(s) orally once a day  predniSONE 5 mg oral tablet: 1 tab(s) orally once a day   Toprol-XL 50 mg oral tablet, extended release: 1 tab(s) orally once a day  Zenpep 20,000 units-63,000 units-84,000 units oral delayed release capsule: 1 cap(s) orally 2 times a day

## 2023-06-01 NOTE — DISCHARGE NOTE PROVIDER - PROVIDER TOKENS
FREE:[LAST:[Wound care],PHONE:[(   )    -],FAX:[(   )    -],ADDRESS:[Upon discharge f/u as outpatient at [facility] [address] [place] [address] [phone]]]

## 2023-06-01 NOTE — DISCHARGE NOTE PROVIDER - NSDCCPCAREPLAN_GEN_ALL_CORE_FT
PRINCIPAL DISCHARGE DIAGNOSIS  Diagnosis: Adult failure to thrive  Assessment and Plan of Treatment: Maintain safety, adequate nutrition, hydration, rest, and activity.  1.) topical therapy: sacral/buttock injury - cleanse with incontinence cleanser, pat dry, apply allevyn foam dressing every other day  2.) Incontinence Management - incontinence cleanser, pads, pericare BID, continue external female urinary catheter  3.) Maintain on an alternating air with low air loss surface  4.) Turn and reposition Q 2 hours  5.) Nutrition optimization - please add shine  6.) Offload heels/feet with complete cair air fluidized boots; ensure that the soles of the feet are not resting on the foot board of the bed.  7.) Chair cushion for chair sitting        SECONDARY DISCHARGE DIAGNOSES  Diagnosis: Permanent atrial fibrillation  Assessment and Plan of Treatment: Atrial fibrillation is the most common heart rhythm problem & has the risk of stroke & heart attack  It helps if you control your blood pressure, not drink more than 1-2 alcohol drinks per day, cut down on caffeine, getting treatment for over active thyroid gland, & getting exercise  Call your doctor if you feel your heart racing or beating unusually, chest tightness or pain, lightheaded, faint, shortness of breath especially with exercise  It is important to take your heart medication as prescribed  You may be on anticoagulation which is very important to take as directed - you may need blood work to monitor drug levels     PRINCIPAL DISCHARGE DIAGNOSIS  Diagnosis: Adult failure to thrive  Assessment and Plan of Treatment: Maintain safety, adequate nutrition, hydration, rest, and activity. While you were in the hospital, it was found that you had severe pulmonary hypertension. We start you on lasix 40mg oral daily. After discussing with your family, it was decided to not pursue additional invasive testing and the goal was to keep you comfortable. You will go home with home hospice. Please follow up your primary care doctor to review your home medications. We were unable to confirm your home medications in the hospital with your family and based it off from your pharmacy. Medications that should be review includes plavix and predisone.         SECONDARY DISCHARGE DIAGNOSES  Diagnosis: Permanent atrial fibrillation  Assessment and Plan of Treatment: Atrial fibrillation is the most common heart rhythm problem & has the risk of stroke & heart attack  It helps if you control your blood pressure, not drink more than 1-2 alcohol drinks per day, cut down on caffeine, getting treatment for over active thyroid gland, & getting exercise  Call your doctor if you feel your heart racing or beating unusually, chest tightness or pain, lightheaded, faint, shortness of breath especially with exercise  It is important to take your heart medication as prescribed  You may be on anticoagulation which is very important to take as directed - you may need blood work to monitor drug levels     PRINCIPAL DISCHARGE DIAGNOSIS  Diagnosis: Adult failure to thrive  Assessment and Plan of Treatment: Maintain safety, adequate nutrition, hydration, rest, and activity. While you were in the hospital, it was found that you had severe pulmonary hypertension. We start you on lasix 40mg oral daily. After discussing with your family, it was decided to not pursue additional invasive testing and the goal was to keep you comfortable. You will go home with home hospice. Please follow up your primary care doctor to review your home medications. We were unable to confirm your home medications in the hospital with your family and based it off from your pharmacy. Medications that should be review includes plavix and predisone.         SECONDARY DISCHARGE DIAGNOSES  Diagnosis: Permanent atrial fibrillation  Assessment and Plan of Treatment: Atrial fibrillation is the most common heart rhythm problem & has the risk of stroke & heart attack  It helps if you control your blood pressure, not drink more than 1-2 alcohol drinks per day, cut down on caffeine, getting treatment for over active thyroid gland, & getting exercise  Call your doctor if you feel your heart racing or beating unusually, chest tightness or pain, lightheaded, faint, shortness of breath especially with exercise  It is important to take your heart medication as prescribed  You may be on anticoagulation which is very important to take as directed - you may need blood work to monitor drug levels    Diagnosis: Deep tissue injury  Assessment and Plan of Treatment:   1.) topical therapy: sacral/buttock injury - cleanse with incontinence cleanser, pat dry, apply allevyn foam dressing every other day  2.) Incontinence Management - incontinence cleanser, pads, pericare BID, continue external female urinary catheter  3.) Maintain on an alternating air with low air loss surface  4.) Turn and reposition Q 2 hours  5.) Nutrition optimization - please add shine  6.) Offload heels/feet with complete cair air fluidized boots; ensure that the soles of the feet are not resting on the foot board of the bed.  7.) Chair cushion for chair sitting  Upon discharge follow up [facility] at [facility] [address] [place] [address] [phone]

## 2023-06-01 NOTE — DISCHARGE NOTE PROVIDER - NSFOLLOWUPCLINICS_GEN_ALL_ED_FT
[facility] General Internal Medicine  General Internal Medicine  [address] [id number]  Phone: [phone]  Fax:   Follow Up Time: 2 weeks

## 2023-06-01 NOTE — DISCHARGE NOTE NURSING/CASE MANAGEMENT/SOCIAL WORK - NSDCPEFALRISK_GEN_ALL_CORE
For information on Fall & Injury Prevention, visit: [email or web address] OR  [email or web address] OR  https://www.cdc.gov/steadi/patient.html

## 2023-06-01 NOTE — DISCHARGE NOTE PROVIDER - CARE PROVIDERS DIRECT ADDRESSES
Oneida Webb   [facility] [address]            1/16/2019      Dear Oneida,    This letter is a reminder that you are due for a colonoscopy. I would like to share some important information about colorectal cancer. Colorectal cancer is a leading cause of cancer death in this country. Colorectal cancer can be stopped in its tracks or detected early with preventative testing. Please call [phone] to schedule the procedure with Dr. Rosales which may be scheduled at [facility] in [place] or [facility] in [place].  If you already spoke to someone in the GI Department and have your procedure scheduled you can disregard this letter.       Your good health is important to us-colorectal cancer screening is important for you.     Sincerely,    Dr. Chico Rosales M.D.  Gastroenterology  [facility]  [address] [place] [address]   ,DirectAddress_Unknown

## 2023-06-01 NOTE — DISCHARGE NOTE NURSING/CASE MANAGEMENT/SOCIAL WORK - NURSING SECTION COMPLETE
Patient/Caregiver provided printed discharge information. Post Acute Skilled Nursing Home follow up  Visit Note     Date of Service: 1/26/2022  Location seen at: [facility]  Subacute / Skilled Need: Rehabilitation    PCP: Kate Ross MD   Patient Care Team:  Kate Ross MD as PCP - General Tom Noe, PHARMD as CDM Heart Failure (Cardiology)  Lindsay Saunders, RN as Care Transitions Nurse (Registered Nurse)  Sangita Roberts MD as Post Acute Facility Provider: Physician (Internal Medicine)  Mary Zhong CNP as Post Acute Facility Provider: APC (Nurse Practitioner - Adult Health)  Attending SNF MD: Alejandra  Seen by Sangita Roberts MD today    César German is a 83 year old male presenting to Post Acute Skilled Nursing for: phy rehab.   History of Present Illness:   83-year-old male with history of BPH, history of hematuria, hypertension, gout, COPD, atrial fibrillation on Coumadin, who initially presented on September 30, 2021 to the hospital with complaints of lower abdominal pain and hematuria, patient had previously been hospitalized for the same problem 3 weeks prior, he underwent bladder irrigation with clearing up of the hematuria, however his pain recurred and he was found to have a stable hematoma with an ill-defined retroperitoneal hemorrhage.  Since then he has suffered significant VIVI requiring CRRT, patient was admitted to the ICU, he required pressors for hypotension and was treated for acute blood loss anemia.  He is now stable in the STIC unit, denies any nausea, vomiting, abdominal pain, shortness of breath, chest pain, dysuria or diarrhea.  He has a right femoral dialysis catheter in place.  He has no prior history of DVT or PE.  Reports being on warfarin for approximately 1.5 to 2 years, prior to these events he has not had any significant problems with bleeding while on warfarin.    Patient is fully vaccinated and boosted for COVID 19.   Hospital course:  1. Gross hematuria with pelvic hematoma   - seen by  urology, taken to OR 1/2 for pelvic exploration, cystoscopy, manual clot evacuation, fulguration of small vessels near bladder neck on 1/2 by Juanjo Valverde   - no further hematuria, c/w Parrish on discharge per urology  2. Possible upper GI bleeding   - Hb increased as expected after blood tx   - seen by GI, had EGD - findings reassuring  3. CKD 4 with VIVI - seen by renal, started on HD   - made 600 ml last 24 hrs prior to discharge  4. Chronic AFib - s/p ppm   - not a good candidate for AC at the present time per cardiology   - started on ASA   - will be evaluated for watchman as outpatient  5. Chr diastolic HF - well compensated, c/w GDMT  6. DM 2 - controlled on LDSS  7. Debility - needs ДМИТРИЙ per PT/OT recs  8. COPD - stable  Above copied from hospital DC summary.    Patient is seen today sitting in WC, says he is able to able independently, denies pain, reports fatigue and weakness from being bed ridden x 2 weeks, has a good appetite and reg BM's. He's had a parrish cath for the past 4 months.  He uses a RW, sometimes cane/cruch for ambulation, lives at home with gd dtr and ggd dtr, 5 stair entry, was independent with self care.  His dtr Griselda is POA, he's considering changing to his GD dtr.    1/21: c/o L hand pain and swelling, unable to make a fist, since yesterday; says he get gout attack in his hands, great toe and knee.  1/26: Reports doing better, no more swelling of L hand, min pain, now able to make a fist;  At therapy: Ambulates 70 feet with rolling walker with CGA, doing 4 stairs with min assist, requires min assist for transfers, CGA for bed mobility.  He requires set up to standby assistance for bathing dressing toileting and hygiene.      HISTORY  Past Medical History:   Diagnosis Date   • Advance directive in chart 08/04/2017   • Arthritis    • Bilateral impacted cerumen 8/26/2020   • Caries 10/3/2017   • Congestive cardiac failure (CMS/HCC)    • COPD (chronic obstructive pulmonary disease)  (CMS/[facility])    • Dermatitis 3/19/2018   • Diabetes mellitus (CMS/[facility])    • Essential (primary) hypertension    • Gastroesophageal reflux disease    • High cholesterol    • Hyperkalemia 1/12/2016   • Hyperlipoproteinemia    • LGI bleed 11/1/2017   • RAD (reactive airway disease)    • Rash and nonspecific skin eruption 3/20/2019      reports that he has quit smoking. His smoking use included cigars. He smoked 0.00 packs per day. His smokeless tobacco use includes chew. He reports current alcohol use. He reports that he does not use drugs.  Past Surgical History:   Procedure Laterality Date   • Abdomen surgery     • Cardiac catherization     • Cardiac surgery     • Colonoscopy     • Pacemaker implant  12/13/2019   • Prostate surgery     • Vascular surgery       Family History   Problem Relation Age of Onset   • Diabetes Mother    • Cancer Mother    • Diabetes Father      History     Not marked as reviewed during this visit.          PROBLEM LIST:  Specialty Problems     None           ADVANCE DIRECTIVES:  César German     AD Type ([facility]#655)     Value   User Date/Time Recorded    Power of  for Health Care  Vicky Cat CMA 10/28/2020 07:57:14          Alternate Agent Name ([facility]#661)     Value   User Date/Time Recorded    Jatin Davis RN 1/3/2022 10:41:04          Alternate Agent Number ([facility]#662)     Value   User Date/Time Recorded    [phone]  Latasha Davis RN 1/3/2022 10:41:04          Decision Maker ([facility]#650)     Value   User Date/Time Recorded    Self  Cierra Michelle RN 7/24/2021 10:11:11    Other  Vicky Cat CMA 10/28/2020 07:57:14          Do you have an AD? ([facility]#652)     Value   User Date/Time Recorded    Yes  Vicky Cat CMA 10/28/2020 07:57:14          [facility] Date Received ([facility]#703)     Value   User Date/Time Recorded    1/17/2022  Eli Roper 1/17/2022 15:29:33    1/3/2022  Latasha Davis RN 1/3/2022 10:41:04    8/4/2017  Vicky Cat CMA 10/28/2020 07:57:14           [facility] Date Signed ([facility]#702)     Value   User Date/Time Recorded    1/28/2020  Latasha Davis RN 1/3/2022 10:41:04    8/4/2017  Vicky Cat CMA 10/28/2020 07:57:14          [facility] LOCATION OF DOCUMENT ([facility]#[id number])     Value   User Date/Time Recorded    Scanned into Medical record  Eli Roper 1/17/2022 15:29:33    Copy placed on paper chart ([facility] only)  Latasha Davis RN 1/3/2022 10:41:04    Scanned into Medical record  Vicky Cat CMA 10/28/2020 07:57:14          Primary Agent Name ([facility]#660)     Value   User Date/Time Recorded    Griselda German  Latasha Davis RN 1/3/2022 10:41:04          Primary Agent Number ([facility]#663)     Value   User Date/Time Recorded    [phone]  Latasha Davis RN 1/3/2022 10:41:04              Advance Care Planning     Advance Care Planning Visit    Patient and/or decision maker consent to Advance Care Planning visit.    Inclusion criteria: Age greater than 80.    PCP:  Cody    Meeting Leader:  Alejandra    Participants:  Patient and myself    Location:  [address] [place] [address]    Purpose of the Meeting:  Advance Care Planning and Goals of Care discussion    Is the patient capable of making healthcare decisions: Yes, the patient is able to receive, process, and then give feedback to information regarding medical discussions.   Decision Maker: Patient     Does the patient have an Advance Care Directive document in Epic? Yes, it is current and updated. It is the most recent version, signed and dated by the patient, and witnessed by two non-family/non-caregiver signatures.    After code status discussion, the patient has decided on: Full Resuscitation       Goals of Care:  Patient has one or more life-limiting conditions: No    A discussion of the patient's Goals of Care has been completed with patient regarding the following:  (1) Current Serious Conditions: VIVI on CKD  (2) Prognosis: FAIR     Function: Decline in function  (3) Patient-Centered Goals:  Aggressive, usual medical care  (4) Plan for Future Deterioration: Would be ok with returning to hospital for care.    The patient verbalized understanding of the above discussion.    The following additional care is recommended: Routine Home Care    Total Time spent in conversation and coordination of care: 20 minutes         DEPRESSION SCREENING:  PHQ 2/9 Test Results  0: Not at all  1: Several days  2: More than half the days  3: Nearly every day  Recent Review Flowsheet Data     Date 12/30/2021    Adult PHQ 2 Score 0    Adult PHQ 2 Interpretation No further screening needed    Little interest or pleasure in activity? 0    Feeling down, depressed or hopeless? 0          DEPRESSION ASSESSMENT/PLAN:  Depression screening is negative no further plan needed.    ALLERGIES:  Allergies as of 01/26/2022   • (No Known Allergies)       CURRENT MEDICATIONS:   Current Outpatient Medications   Medication Sig Dispense Refill   • colchicine (COLCRYS) 0.6 MG tablet Take 0.6 mg by mouth daily.     • senna (SENOKOT) 8.6 MG tablet Take 2 tablets by mouth daily.     • metoPROLOL succinate (TOPROL-XL) 100 MG 24 hr tablet TAKE 1 TABLET BY MOUTH DAILY. 90 tablet 2   • lactulose (CHRONULAC) 10 GM/15ML solution Take 20 g by mouth daily as needed.     • lidocaine (XYLOCAINE) 2 % jelly Apply topically 2 times daily.     • pantoprazole (PROTONIX) 40 MG tablet Take 1 tablet by mouth nightly. 30 tablet 3   • traZODone (DESYREL) 50 MG tablet Take 1 tablet by mouth nightly. 30 tablet 3   • aspirin 81 MG EC tablet Take 1 tablet by mouth daily. 30 tablet 3   • sitaGLIPtin (JANUVIA) 50 MG tablet Take 1 tablet by mouth daily. 90 tablet 1   • metoPROLOL succinate (TOPROL-XL) 25 MG 24 hr tablet Take 1 tablet by mouth daily. 90 tablet 1   • docusate sodium-sennosides (SENOKOT S) 50-8.6 MG per tablet Take 1 tablet by mouth nightly. 30 tablet 0   • finasteride (PROSCAR) 5 MG tablet Take 1 tablet by mouth daily. Do not start before November 22, 2021. 30  tablet 3   • polyethylene glycol (MIRALAX) 17 g packet Take 17 g by mouth daily. Do not start before November 22, 2021. Stir and dissolve powder in any 4 to 8 ounces of beverage, then drink. 12 each 3   • colchicine (COLCRYS) 0.6 MG tablet Take 1 tablet by mouth daily as needed (gout). 1 tablet 0   • allopurinol (ZYLOPRIM) 300 MG tablet Take 300 mg by mouth daily.      • Ventolin  (90 Base) MCG/ACT inhaler INHALE 2 PUFFS INTO THE LUNGS EVERY 4 HOURS AS NEEDED FOR SHORTNESS OF BREATH OR WHEEZING. (Patient taking differently: Inhale 2 puffs into the lungs every 4 hours as needed. ) 18 g 0   • Breo Ellipta 200-25 MCG/INH inhaler INHALE 1 PUFF INTO THE LUNGS DAILY. 60 each 3   • acetaminophen (TYLENOL) 500 MG tablet Take 500 mg by mouth every 4 hours as needed.     • tamsulosin (FLOMAX) 0.4 MG Cap TAKE ONE TABLET BY MOUTH ONCE DAILY 90 capsule 3   • fluticasone (FLONASE) 50 MCG/ACT nasal spray Spray 2 sprays in each nostril daily. 16 g 12     No current facility-administered medications for this visit.       CURRENT SNF MEDICATIONS:  See med rec    BASELINE FUNCTIONAL STATUS:  Walker and Cane    CURRENT FUNCTIONAL STATUS:  Weight bearing as tolerated (WBAT)    DIET:  Consistency: General   Type: DM diet  Appetite: Good    REVIEW OF SYSTEMS:  Review of Systems   Constitutional: Positive for activity change.   HENT: Negative.    Eyes: Negative.    Respiratory: Negative.    Cardiovascular: Positive for leg swelling.   Endocrine: Negative.    Genitourinary: Positive for difficulty urinating.   Musculoskeletal: Positive for arthralgias.        L hand pain min   Skin: Negative.    Allergic/Immunologic: Negative.    Neurological: Negative.    Hematological: Negative.    Psychiatric/Behavioral: Negative.        PHYSICAL ASSESSMENT:  Physical Exam  Vitals reviewed.   Constitutional:       Appearance: He is well-developed. He is obese.   HENT:      Head: Normocephalic and atraumatic.   Eyes:      Conjunctiva/sclera:  Conjunctivae normal.   Cardiovascular:      Rate and Rhythm: Normal rate and regular rhythm.      Heart sounds: Normal heart sounds.   Pulmonary:      Effort: Pulmonary effort is normal.      Breath sounds: Normal breath sounds.   Abdominal:      General: Bowel sounds are normal.      Palpations: Abdomen is soft.   Genitourinary:     Comments: Awan with clear urine  Musculoskeletal:      Cervical back: Neck supple.      Right lower leg: Edema present.      Left lower leg: Edema present.      Comments: L hand MCP joint min tenderness, improved ROM  B/L LE 1+ edema    Skin:     General: Skin is warm and dry.      Comments: R chest HD cath    Neurological:      General: No focal deficit present.      Mental Status: He is alert and oriented to person, place, and time.   Psychiatric:         Mood and Affect: Mood normal.         Behavior: Behavior normal.         Thought Content: Thought content normal.         Judgment: Judgment normal.         VITALS:  Visit Vitals  /78   Pulse 78   Temp 97.6 °F (36.4 °C)   Resp 16     Pain Level 5 /10    LABS:  1/24: Sodium 141 potassium 3.6 BUN 16 creatinine 3.2 WBC 8 hemoglobin 8.3 platelet 205  UA, UCx neg  1/14: wbc 12.9 Hb 8.3 plt 159 Na 141 K 4.1 bun 23 Cr 4.81    EGD: Mild reflux esophagitis, Hiatal hernia.    LE US 1/9: no dvt    CTA abdom 1/3: Relatively unchanged hematoma involving the left space of Retzius with subtle ill-defined retroperitoneal hemorrhage.  No active contrast  extravasation is seen on the arterial phase in the vicinity of the left  pelvic hematoma.    Moderate bilateral hydronephrosis and hydroureter is again seen.       ASSESSMENT AND PLAN  Assessment   1.  Recent retroperitoneal and L pelvic hematoma  S/P pelvic exploration, cystoscopy and clot evacuation   To c/w Awan   F/U with urology    2. CKD 4 with VIVI  Cont HD 3x week  Follow with nephrology    3. Chronic AFib - s/p ppm   Not a good candidate for AC at the present time per cardiology    Cont ASA  F/U with cardio to be evaluated for watchman as outpatient    4. HTN/Chr diastolic HF, well compensated  Cont with GDMT    5. DM 2  Cont meds  Cont BGM    6.Debility  Cont PT/OT    7. COPD, stable  Cont Breo and bronchodilators    8.Gout, acute L hand; improved  Completed colchicine and prednisone  Continue allopurinol      DVT proph: ambulation  No AC due to bleed.      FOLLOW UP APPOINTMENTS:  F/u with Urology,   F/U cardiology  F/U Nephrology    DISCHARGE PLANNING: Home with [facility]    Discussed with: RN / Nursing and Patient    Prognosis: fair    Total time spent is more than 35 minutes, with more than 50% of the time spent in coordination of care, counseling, review of records and discussion of plan of care with the patient /staff /family.    Home Health Face-To-Face    I had a face-to-face encounter that meets the provider face-to-face encounter requirement with this patient on 1/26/2022.    The encounter with the patient was in whole, or part, for the following medical condition, which is the primary reason for home health care (list medical conditions):  Chronic kidney disease on hemodialysis  BPH with urinary obstruction and chronic Awan catheter  A. fib  Hypertension with heart failure  Diabetes mellitus with renal disease  Chronic gout    I certify that, based on my findings, the following services are medically necessary home health services:   Nursing  Physical Therapy    My clinical findings support the need for the above service because of the need to monitor safety and medication at home and improve functional status.    Further, this patient is homebound because:  · Unsteady gait with assistive device because weakness  · Requires an assistive device to ambulate because unstaedy gait    This patient is confined to his home (and meets homebound criteria) and needs intermittent skilled nursing care, physical therapy and/or speech therapy or continues to need occupational therapy.  The patient is  under my care, and a plan of care has been initiated and will periodically be reviewed by a physician.  I face-to-face encounter with this patient on the above date, during which the primary reason for home health services was addressed.  I have a clinical note (supporting documentation) documenting my encounter with the patient in the patient's medical record to support certification and eligibility for home care, and will make it available to [facility] upon request.

## 2023-06-01 NOTE — DISCHARGE NOTE NURSING/CASE MANAGEMENT/SOCIAL WORK - NSDCVIVACCINE_GEN_ALL_CORE_FT
Tdap; 28-Aug-2019 00:32; Ashley Spence (RN); [employer]; [id number] (Exp. Date: 19-Mar-2021); IntraMuscular; Deltoid Left.; 0.5 milliLiter(s); VIS (VIS Published: 09-May-2013, VIS Presented: 28-Aug-2019);

## 2023-06-01 NOTE — DISCHARGE NOTE NURSING/CASE MANAGEMENT/SOCIAL WORK - PATIENT PORTAL LINK FT
Intensive Care Unit  Intensivist Progress Note    Patient: Adriel Aquino  : 1950  MRN#: [id number]  2022 8:04 AM  ADMISSION DAY:  2022 11:45 AM     Subjective: The patient was extubated on , she is more awake today. She was weaned oxygen and currently she is on room air. Continues to be  weak  Blood pressure is acceptable. WBC started to trend down, today is down to 16.1  The drainage fluid was positive for E. Coli and Klebsiella on ceftriaxone and Diflucan. Had session of hemodialysis yesterday. Still anuric    HPI:   Adriel Aquino is a 67 y.o. female with a history of CKD, diabetes, hyperlipidemia, hypertension, and neuropathy who presents to the ED complaining of abdominal pain/syncope. By EMS report, patient has had constipation over the last several days. .  Patient reportedly had a large bowel movement and shortly thereafter had a syncopal/near syncopal event where she was helped to the floor. EMS was called and arrived to find patient with blood pressure of 50/30.  300 cc of normal saline were infused during truck ride to the ED. Blood pressure shortly after arrival was 134/94. Patient reports lower abdominal discomfort. No additional complaints. No other complaints, modifying factors or associated symptoms. In ER -Patient seen and evaluated. Old records reviewed. Labs and imaging reviewed and results discussed with patient. Patient was given broad-spectrum antibiotics, normal saline, and multiple pressors. Central line was placed. Improvement of symptoms throughout patient's stay in the emergency department. Labs reveal significant leukocytosis with elevated lactic and concern for UTI. Mild DEJAN noted. Imaging shows nonspecific enteritis. Patient will be admitted for further evaluation and treatment. . Patient was reassessed as noted above . Natalya Alfred Plan of care discussed with patient and family. Patient and family in agreement with plan.    Patient was transferred to the ICU for further management  Patient when seen in the ICU was somewhat lethargic but was able to give simple answers and was complaining of abdominal pain, patient was also was found to be significantly hypotensive in spite of patient getting IV Levophed, patient's systolic blood pressure was 52 mmHg when seen, patient's blood sugars were still on the higher side now which was lower as per EMT, patient was given dextrose 50 IV push in the EMS as per documentation, patient was having sinus rhythm on the monitor which was ranging from normal sinus rhythm to sinus tachycardia, patient was given another fluid bolus and also vasopressin infusion was started on the patient, no other pertinent review of system of concern    Patient Vitals for the past 8 hrs:   BP Temp Temp src Pulse Resp SpO2   09/23/22 0600 (!) 131/55 -- -- 81 13 93 %   09/23/22 0500 (!) 134/58 -- -- 79 21 95 %   09/23/22 0400 (!) 132/56 98.5 °F (36.9 °C) Axillary 76 20 96 %   09/23/22 0300 (!) 129/54 -- -- 72 18 95 %   09/23/22 0200 (!) 119/59 -- -- 71 19 95 %   09/23/22 0100 126/60 -- -- 77 17 96 %         EXAM:  HENT: NG tube in place, head is atraumatic and normocephalic  Eyes:  Conjunctivae are normal. Pupils equal, round, and reactive to light. No scleral icterus. Neck: . No tracheal deviation present. No obvious thyroid mass. Cardiovascular: Sinus rhythm normal heart sounds. No right ventricular heave. No lower extremity edema. Pulmonary/Chest: No wheezes. No rales. No accessory muscle usage or stridor. Decreased breath sound bilaterally  Abdominal: Soft. Tender. No distension or hernia. Ostomy present  Musculoskeletal: No cyanosis. No clubbing. No obvious joint deformity. Lymphadenopathy: No cervical or supraclavicular adenopathy. Skin: Skin is warm and dry. No rash or nodules on the exposed extremities.         Intake/Output Summary (Last 24 hours) at 9/23/2022 0804  Last data filed at 9/23/2022 0448  Gross per 24 hour   Intake 245 ml   Output 575 ml   Net -330 ml       I/O last 3 completed shifts: In: 565 [NG/GT:565]  Out: 600 [Drains:60; Stool:540]   Date 09/23/22 0000 - 09/23/22 2359   Shift 4095-1551 2623-1927 5764-0388 24 Hour Total   INTAKE   NG/GT(mL/kg) 167(2.1)   167(2.1)   Shift Total(mL/kg) 167(2.1)   167(2.1)   OUTPUT   Drains(mL/kg) 10(0.1)   10(0.1)   Stool(mL/kg) 40(0.5)   40(0.5)   Shift Total(mL/kg) 50(0.6)   50(0.6)   Weight (kg) 77.9 77.9 77.9 77.9       Wt Readings from Last 3 Encounters:   09/22/22 171 lb 11.8 oz (77.9 kg)   07/14/22 161 lb (73 kg)      Body mass index is 25.36 kg/m².          Scheduled Meds:   insulin glargine  10 Units SubCUTAneous Nightly    albumin human  25 g IntraVENous Once    cefTRIAXone (ROCEPHIN) IV  2,000 mg IntraVENous Q24H    insulin lispro  0-16 Units SubCUTAneous Q4H    fluconazole  200 mg IntraVENous Q24H    pantoprazole  40 mg IntraVENous Daily     Continuous Infusions:   sodium chloride 25 mL (09/22/22 2024)    dextrose      sodium chloride 100 mL/hr at 09/21/22 2122     PRN Meds:heparin (porcine), 3,200 Units, PRN  fentanNYL, 25 mcg, Q4H PRN  prochlorperazine, 5 mg, Q6H PRN  potassium chloride, 20 mEq, PRN  magnesium sulfate, 1,000 mg, PRN  calcium gluconate, 1,000 mg, PRN   Or  calcium gluconate, 2,000 mg, PRN   Or  calcium gluconate, 3,000 mg, PRN   Or  calcium gluconate, 4,000 mg, PRN  sodium chloride flush, 5-40 mL, PRN  sodium chloride, 25 mL, PRN  HYDROmorphone, 0.25 mg, Q5 Min PRN  HYDROmorphone, 0.5 mg, Q5 Min PRN  ondansetron, 4 mg, Q10 Min PRN  fentanNYL, 25 mcg, Q1H PRN  glucose, 4 tablet, PRN  dextrose bolus, 125 mL, PRN   Or  dextrose bolus, 250 mL, PRN  glucagon (rDNA), 1 mg, PRN  dextrose, , Continuous PRN  sodium chloride flush, 5-40 mL, PRN  sodium chloride, , PRN  polyethylene glycol, 17 g, Daily PRN  acetaminophen, 650 mg, Q6H PRN   Or  acetaminophen, 650 mg, Q6H PRN      Oxygen Delivery - O2 Flow Rate (L/min): 0 L/min  VENT SETTINGS (Comprehensive)  Vent Information  Equipment Changed: HME  Ventilator Initiate: Yes  Vent Mode: (S) CPAP/PS  Additional Respiratory Assessments  Heart Rate: 81  Resp: 13  SpO2: 93 %  Humidification Source: HME  Circuit Condensation: Drained      DATA:    CBC:   Recent Labs     09/21/22  0500 09/21/22  1420 09/22/22  0526 09/23/22  0710   WBC 25.6*  --  24.1* 16.1*   RBC 2.72*  --  2.85* 2.54*   HGB 7.4* 8.0* 7.9* 7.1*   HCT 22.7* 24.2* 23.8* 21.3*   MCV 83.5  --  83.7 83.7   MCH 27.3  --  27.7 27.9   MCHC 32.7  --  33.1 33.3   RDW 14.6  --  14.3 14.8   PLT 87*  --  106* 128*   MPV 8.1  --  8.8 8.9        BMP/CMP:   Recent Labs     09/21/22  0500 09/22/22  0526 09/23/22  0450   * 133* 134*   K 4.1 4.3 4.2   CL 98* 98* 99   CO2 21 21 19*   ANIONGAP 15 14 16   BUN 51* 70* 49*   CREATININE 2.5* 3.6* 2.7*   GLUCOSE 282* 218* 241*   CALCIUM 8.0* 8.0* 7.9*   PROT 5.0*  --   --    LABALBU 2.4* 2.4* 2.4*   BILITOT 0.4  --   --    ALKPHOS 175*  --   --    AST 15  --   --    ALT 9*  --   --         Other Electrolytes:   Recent Labs     09/21/22  0500 09/22/22  0526 09/22/22  0532 09/23/22  0450   CAION 1.11*  --  1.07* 1.08*   PHOS 3.3 3.1  --  3.6   MG 2.40  --   --   --        Serum Osmolality  No results for input(s): OSMOMEASER in the last 72 hours. Procalcitonin:  No results for input(s): PROCAL in the last 72 hours. Cardiac: No results for input(s): TROPONINT in the last 72 hours. Lipids: No results for input(s): CHOL, HDL in the last 72 hours. Invalid input(s): LDLCALCU  Coagulation:   Recent Labs     09/21/22  0500   INR 1.27*       Lactic Acid:   No results for input(s): LACTA in the last 72 hours.      ABGs:   No results found for: PH, PCO2, PO2, HCO3, O2SAT  No results found for: Crista Deanitish    Radiology/Imaging:   XR CHEST PORTABLE [[id number]] Collected: 09/15/22 2226     Order Status: Completed Updated: 09/16/22 1429     Narrative:       EXAMINATION:   ONE XRAY VIEW OF THE CHEST     9/15/2022 8:40 pm COMPARISON:   09/14/2022     HISTORY:   ORDERING SYSTEM PROVIDED HISTORY: right IJ central line evaluation, intubated   after OR   TECHNOLOGIST PROVIDED HISTORY:   Reason for exam:->right IJ central line evaluation, intubated after OR   Reason for Exam: ett; cvc     FINDINGS:   Endotracheal tube tip is 5.3 cm above the mark. Right IJ CVC catheter tip   overlies the SVC at the level of the thoracic inlet. The heart appears borderline enlarged; however, this may be accentuated by   technique. Possible small right pleural effusion. No pneumothorax is seen. Mild bibasilar airspace opacities. Impression:       Right IJ CVC catheter tip overlies the SVC at the level of the thoracic inlet. Endotracheal tube tip is 5.3 cm above the mark. Mild bibasilar airspace opacities, which could represent as atelectasis   and/or infiltrate. Possible small right pleural effusion. XR ABDOMEN FOR NG/OG/NE TUBE PLACEMENT [[id number]] Collected: 09/16/22 0839     Order Status: Completed Updated: 09/16/22 0842     Narrative:       EXAMINATION:   ONE SUPINE XRAY VIEW(S) OF THE ABDOMEN     9/16/2022 7:29 am     COMPARISON:   None. HISTORY:   ORDERING SYSTEM PROVIDED HISTORY: Confirmation of course of NG/OG/NE tube and   location of tip of tube   TECHNOLOGIST PROVIDED HISTORY:   Reason for exam:->Confirmation of course of NG/OG/NE tube and location of tip   of tube   Portable? ->Yes   Reason for Exam: NGT placement     FINDINGS:   Tip of NG tube projects in the left upper quadrant in the region of the   gastric fundus     Postsurgical change seen in upper abdomen. Pleuroparenchymal disease is seen at the right lung base.       Impression:       Tip of NG tube projects in the left upper quadrant in the region of the   gastric fundus      XR CHEST PORTABLE [[id number]] Collected: 09/16/22 0130     Order Status: Completed Updated: 09/16/22 0137     Narrative:       EXAMINATION:   ONE XRAY VIEW OF THE CHEST     9/15/2022 11:34 pm     COMPARISON:   09/15/2022     HISTORY:   ORDERING SYSTEM PROVIDED HISTORY: central line insertion   TECHNOLOGIST PROVIDED HISTORY:   Reason for exam:->central line insertion   Reason for Exam: central line     FINDINGS:   Endotracheal tube appears in appropriate position. Interval placement of a   jugular venous central catheter which extends into the inferior aspect of the   right atrium. The catheter tip measures approximately 5 cm beyond the   cavoatrial junction. No acute osseous abnormality is identified. Small   right-sided pleural effusion similar appearing basilar airspace disease. Osseous structures appear similar. Impression:       Interval placement of a right-sided central venous catheter which extends   into the inferior aspect of the right atrium, approximately 5 cm beyond the   cavoatrial junction. CT ABDOMEN PELVIS WO CONTRAST Additional Contrast? None [[id number]] Collected: 09/15/22 1017     Order Status: Completed Updated: 09/15/22 1027     Narrative:       EXAMINATION:   CT OF THE ABDOMEN AND PELVIS WITHOUT CONTRAST 9/15/2022 10:00 am     TECHNIQUE:   CT of the abdomen and pelvis was performed without the administration of   intravenous contrast. Multiplanar reformatted images are provided for review. Automated exposure control, iterative reconstruction, and/or weight based   adjustment of the mA/kV was utilized to reduce the radiation dose to as low   as reasonably achievable. COMPARISON:   09/14/2022 CT     HISTORY:   ORDERING SYSTEM PROVIDED HISTORY: worsening acidosis, hypotension ? ischemia   TECHNOLOGIST PROVIDED HISTORY:   Reason for exam:->worsening acidosis, hypotension ? ischemia   Additional Contrast?->None   Reason for Exam: worsening labs, abd pain     FINDINGS:   Lower Chest:  Interval development of a small right pleural effusion with   dense peripheral airspace opacification in the right lower lobe.   Base of the   heart is mucosal thickening of the distal colon which may   represent a pattern of colitis. 4. Small right pleural effusion with airspace changes in the right lower   lobe, new from prior exam.   5. Evidence of chronic liver disease with a small amount of ascites stable. CT ABDOMEN PELVIS WO CONTRAST Additional Contrast? None [[id number]] Collected: 09/14/22 1452     Order Status: Completed Updated: 09/14/22 5197     Narrative:       EXAMINATION:   CT OF THE ABDOMEN AND PELVIS WITHOUT CONTRAST, 9/14/2022 2:46 pm     TECHNIQUE:   CT of the abdomen and pelvis was performed without the administration of   intravenous contrast. Multiplanar reformatted images are provided for review. Automated exposure control, iterative reconstruction, and/or weight based   adjustment of the mA/kV was utilized to reduce the radiation dose to as low   as reasonably achievable. COMPARISON:   None     HISTORY:   ORDERING SYSTEM PROVIDED HISTORY:  Abdominal pain/ near syncope   TECHNOLOGIST PROVIDED HISTORY:   Additional Contrast?  None   Reason for Exam:  Abdominal pain/ near syncope   Decision Support Exception - unselect if not a suspected or confirmed   emergency medical condition->Emergency Medical Condition (MA)   Reason for Exam:  Vomiting and pain     FINDINGS:   Lower Chest: No active disease. Organs: The liver appears unremarkable. Status post cholecystectomy. Pancreas and spleen, adrenals, kidneys, aorta and IVC appear stable. GI/Bowel: There is evidence of thickening of the duodenum as well as jejunal   loops with perijejunal inflammatory changes. These changes are compatible   with acute enteritis. No evidence of bowel obstruction or perforation. Evidence of constipation and significant stool impaction in the rectum. Pelvis: Urinary bladder contains Osorio catheter. The uterus and adnexa   demonstrate no acute abnormality.      Peritoneum/Retroperitoneum: No evidence of retroperitoneal lymphadenopathy or acute peritoneal findings. Mild ascites mostly around the liver and spleen. Bones/Soft Tissues: No acute abnormality. Osteopenia. Moderate multilevel   degenerative disc disease. Impression:       1. Acute enteritis involving the duodenum and jejunal loops with thickening   of the loops and edema. No evidence of bowel obstruction. 2. Constipation with significant stool impaction in the rectum. 3. Mild ascites mostly around the liver and spleen. 4. Adequate position of Osorio catheter in the urinary bladder. XR CHEST PORTABLE [[id number]] Collected: 09/14/22 1334     Order Status: Completed Updated: 09/14/22 1349     Narrative:       EXAMINATION:   ONE XRAY VIEW OF THE CHEST     9/14/2022 10:22 am     COMPARISON:   None. HISTORY:   ORDERING SYSTEM PROVIDED HISTORY: confirm central line   TECHNOLOGIST PROVIDED HISTORY:   Reason for exam:->confirm central line   Reason for Exam: confirm central line     FINDINGS:   A right internal jugular venous catheter is been placed with the tip at the   cavoatrial junction. No pneumothorax is identified. The lungs and costophrenic angles are clear. The cardiomediastinal   silhouette and pulmonary vessels appear normal.      Impression:       Placement of a right internal jugular central venous catheter without evident   complication. The tip is at approximately the cavoatrial junction.      No acute findings in the chest.          Patient Active Problem List   Diagnosis    DKA (diabetic ketoacidosis) (HCC)    Hypotension    Syncope    Hyponatremia    DEJAN (acute kidney injury) ([address]    Abdominal pain    Enteritis    Septic shock (HCC)    Elevated troponin    Leukocytosis    Pyuria    Lactic acidosis    DM (diabetes mellitus), secondary uncontrolled ([address]    Ischemic colitis ([address]    S/P exploratory laparotomy    Acute postoperative pulmonary insufficiency (HCC)    Acute respiratory failure with hypoxia (HCC)    Acute encephalopathy    Moderate malnutrition [facility])       Assessment:    Ischemic colitis s/p exploratory laparotomy and sigmoid colectomy and colostomy on 9/15/2022  Septic shock. Acute encephalopathy  Acute renal failure  Acute respiratory failure with hypoxia  Syncope  Electrolytes abnormality  Diabetes mellitus type 2. Hyponatremia        Plan:    Extubated on 9/21. Currently on room air. WBC started to trend down. The abdomen is tender, CT scan showed fluid collection that failed to drainage. Continue ceftriaxone 2 g daily. Continue  Diflucan  Continue trophic tube feed. Surgery is following. Repeat CT scan of the abdomen on Sunday  Pain control.   Pressors are off  CT scan of the brain is negative  DVT and GI prophylaxis    Reviewed with ICU Staff     Seen with multidisciplinary ICU team         Tio Nolen MD, You can access the FollowMyHealth Patient Portal offered by [facility] by registering at the following website: [email or web address]. By joining [employer]’s FollowMyHealth portal, you will also be able to view your health information using other applications (apps) compatible with our system.

## 2023-06-01 NOTE — DISCHARGE NOTE PROVIDER - ATTENDING DISCHARGE PHYSICAL EXAMINATION:
CONSTITUTIONAL: NAD   RESPIRATORY: cta  CARDIOVASCULAR: irregularly irregular, normal S1 and S2, no murmur/rub/gallop; No Peripheral pulses are 2+ bilaterally  ABDOMEN: Nontender to palpation, normoactive bowel sounds, no rebound/guarding  MUSCLOSKELETAL: no clubbing or cyanosis of digits; no joint swelling or tenderness to palpation. No RLE tenderness or knee swelling  EXT: LE improving. Thinned skin.   PSYCH: A+O x2    Updated daughter who is unable to confirm home medications. Reconciliation based on pharmacy and unclear why patient on plavix and prednsione. Advised daughter to have patient follow up with her primary doctor to review home medications. Home hospice

## 2023-06-01 NOTE — DISCHARGE NOTE PROVIDER - CARE PROVIDER_API CALL
Wound care,   Upon discharge f/u as outpatient at [facility] [address] [facility] [phone]  Phone: (   )    -  Fax: (   )    -  Follow Up Time:

## 2023-06-01 NOTE — DISCHARGE NOTE PROVIDER - NSDCCPTREATMENT_GEN_ALL_CORE_FT
PRINCIPAL PROCEDURE  Procedure: Echo 2D  Findings and Treatment:   < end of copied text >   1. Right ventricular volume overload. The left ventricular systolic function is mildly decreased with an ejection fraction of 47 % by Roger's method of disks. There is global left ventricular hypokinesis.   2. Moderately enlarged right ventricular cavity size and reduced systolic function. The tricuspid annular plane systolic excursion (TAPSE) is 1.2 cm (normal >=1.7 cm).   3. The left atrium is normal.   4. The right atrium is severely dilated.   5. Tricuspid valve leaflets appear mildly thickened. Incomplete leaflet closure of the tricuspid valve due to tethering. Severe tricuspid regurgitation.   6. The inferior vena cava is dilated measuring 2.30 cm in diameter, (dilated >2.1cm) with abnormal inspiratory collapse (abnormal <50%) consistent with elevated right atrial pressure (~15, range 10-20mmHg).   7. Estimated pulmonary artery systolic pressure is 95 mmHg.   8. Trace aortic regurgitation.   9. Fibrocalcific aortic valve sclerosis without stenosis.  10. Findings were discussed with Dr. Epps on 5/26/2023 at 1:36pm. No prior echocardiogram is available for comparison.< from: TTE W or WO Ultrasound Enhancing Agent (05.26.23 @ 11:41) >

## 2024-02-22 NOTE — ED ADULT TRIAGE NOTE - NS ED NOTE AC HIGH RISK COUNTRIES
Occupational Therapy  Occupational Therapy Orthopedic Evaluation    Patient Name: Yosvany Uriarte  MRN: [id number]  Today's Date: 2/22/2024  Time Calculation  Start Time: 0245  Stop Time: 0325  Time Calculation (min): 40 min    Insurance:  Visit number: 2 of [place]  Authorization info: none required  Medicare certification  -  Start: 1/22/24   End: 3/22/24    Current Problem  1. Other closed fracture of distal end of left radius with routine healing, subsequent encounter  Follow Up In Occupational Therapy          Referred by: Mary Carmen Mendiola PA-C  Referred for:  L distal radius fx  Onset/DOI: 9/14/23    Fall risk: Low  Precautions: None     Medical History Form: Reviewed (scanned into chart)    Subjective   Chief Complaint: L wrist fracture    Hand Dominance: Right    Pain:   3-4  Location: L dorsal   Description: aching      Objective     WRIST AROM (Degrees)   R L   Extension WNL 55 (A)    70 (P)   Flexion WNL 50 (A)    65 (P)     HAND STRENGTH (Lbs)   R L   Dynamometer  55lb 44lb       Outcome Measures:  QUICK DASH: 20.45%    Treatments:  Exercise Sets/Reps Comments   Prayer stretch     Wrist flexion stretch     Finger ADD with red putty     Composite fist rest putty                            TREATMENT TODAY    Manual:  - STM to volar and dorsal forearm to reduce stiffness and increase wrist mobility x 15 min    Therapeutic ex  - green flexbar sup/pron to increase wrist strength 3x12  - 1lb hammer sup/pron to increase wrist stabilization strength 3x10  - 3lb wrist ABCs with intrinsic grasp to increase strength 2x  - green putty finger ADD to increase intrinsic  strength x 10 each web space  - reviewed HEP to ensure porper carryover to home x 5 min      EDUCATION: Home exercise program, plan of care, activity modifications, joint protection, pain management, and injury pathology       Assessment:   Excellent progress in strength since start of care.  L  strength improved from 28 to 44lb.  She required re-ed  of finger ADD strengthening with putty as she was performing this incorrectly.  Has improved understanding not.      Plan:     Planned Interventions include: therapeutic exercise, self-care home management, manual therapy, therapeutic activities, , neuromuscular coordination, vasopneumatic, dry needling, and orthosis fabrication.  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  2) Pt well demo at least 10 degrees improved L wrist extension for completion of work tasks, in 4 weeks.  3) Pt will correctly return demo of entire HEP to ensure proper carryover to home, in 2 weeks    Plan of care was developed with input and agreement by the patient      Harsha Alvarez OT   No

## 2024-05-10 NOTE — H&P ADULT - PROBLEM SELECTOR PLAN 2
Pt not taken to nuclear medicine. Nuclear med and MARITZA Velazquez aware. new microcytic anemia with thinning stools, early satiety, concerning for GI pathology, posisbly colon mass  -spoke to patient's family. Dr. Brisa Baker, who prefers conservative diagnostic workup prior to colonooscpy given age.   -iron studies  -CT abdomen/pelvis with oral and Iv contrast.

## 2025-01-11 NOTE — DISCHARGE NOTE NURSING/CASE MANAGEMENT/SOCIAL WORK - NSTOBACCONEVERSMOKERY/N_GEN_A
[Never] : Never [With Patient/Caregiver] : , with patient/caregiver [Designated Healthcare Proxy] : Designated healthcare proxy No [Name: ___] : Health Care Proxy's Name: [unfilled]  [Relationship: ___] : Relationship: [unfilled] [Never (0 pts)] : Never (0 points) [No] : In the past 12 months have you used drugs other than those required for medical reasons? No [1] : 2) Feeling down, depressed, or hopeless for several days (1) [PHQ-2 Negative - No further assessment needed] : PHQ-2 Negative - No further assessment needed [Fully functional (bathing, dressing, toileting, transferring, walking, feeding)] : Fully functional (bathing, dressing, toileting, transferring, walking, feeding) [Audit-CScore] : 0 [VRS4Zcaas] : 2 [MammogramComments] : overdue- [AdvancecareDate] : 01/2025

## 2025-03-21 NOTE — PROGRESS NOTE ADULT - SUBJECTIVE AND OBJECTIVE BOX
Anesthesia Post Evaluation    Patient: Natalia Sue    Procedure(s) Performed: * No procedures listed *    Final Anesthesia Type: general      Patient location during evaluation: PACU  Patient participation: Yes- Able to Participate  Level of consciousness: awake and alert and oriented  Post-procedure vital signs: reviewed and stable  Pain management: adequate  Airway patency: patent    PONV status at discharge: No PONV  Anesthetic complications: no      Cardiovascular status: blood pressure returned to baseline, stable and hemodynamically stable  Respiratory status: unassisted  Hydration status: euvolemic  Follow-up not needed.              Vitals Value Taken Time   /73 03/21/25 11:01   Temp 36.6 °C (97.8 °F) 03/21/25 10:37   Pulse 86 03/21/25 11:03   Resp 17 03/21/25 11:07   SpO2 99 % 03/21/25 11:03   Vitals shown include unfiled device data.      No case tracking events are documented in the log.      Pain/Iqra Score: Iqra Score: 10 (3/21/2025 10:57 AM)          
Podiatry pager #: [phone]    Patient is a 89y old  Female who presents with a chief complaint of failure to thrive (30 May 2023 18:12)Podiatry evaluation today of heel wound.      HPI:  89 yo female PMHx permanent atrial fibrillation, HTN presents with 2 months of owrsening lower extremity edema, found to have severe electrolyte abnormalities and sent to ED by PMd. Per patietn, has been feeling weaker at home, with worsenin gSOB with exertion. No chest pain. +10 lb weight gain. +early satiety, +thinned stools, no n/v/d/c, no melena or hematochezia. Went to PMD intiially 2 months ago, no workup, went yesterday because worsening, send to ED after labs returned. currently no furhter complaints than above. +Hungry.    On collateral information from patient's PMD, last colonoscopy was in 2007, TTE was 2018, CBC in 1/2023 11.7 with MCV 89. (26 May 2023 09:30)      PAST MEDICAL & SURGICAL HISTORY:  Hypertension, unspecified type      Atrial fibrillation, unspecified type      No significant past surgical history          MEDICATIONS  (STANDING):  apixaban 2.5 milliGRAM(s) Oral every 12 hours  atorvastatin 10 milliGRAM(s) Oral at bedtime  chlorhexidine 2% Cloths 1 Application(s) Topical daily  ferrous    sulfate 325 milliGRAM(s) Oral daily  furosemide    Tablet 40 milliGRAM(s) Oral daily  metoprolol succinate ER 50 milliGRAM(s) Oral daily  predniSONE   Tablet 5 milliGRAM(s) Oral daily    MEDICATIONS  (PRN):  acetaminophen     Tablet .. 650 milliGRAM(s) Oral every 6 hours PRN Temp greater or equal to 38C (100.4F), Mild Pain (1 - 3)  aluminum hydroxide/magnesium hydroxide/simethicone Suspension 30 milliLiter(s) Oral every 4 hours PRN Dyspepsia  melatonin 3 milliGRAM(s) Oral at bedtime PRN Insomnia      Allergies    penicillin (Unknown)    Intolerances        VITALS:    Vital Signs Last 24 Hrs  T(C): 36.5 (31 May 2023 04:53), Max: 36.6 (30 May 2023 20:53)  T(F): 97.7 (31 May 2023 04:53), Max: 97.9 (30 May 2023 20:53)  HR: 83 (31 May 2023 04:53) (79 - 96)  BP: 126/81 (31 May 2023 04:53) (111/71 - 132/78)  BP(mean): --  RR: 18 (31 May 2023 04:53) (18 - 18)  SpO2: 97% (31 May 2023 04:53) (95% - 98%)    Parameters below as of 31 May 2023 04:53  Patient On (Oxygen Delivery Method): room air        LABS:                          9.3    6.19  )-----------( 320      ( 30 May 2023 06:45 )             29.8       05-30    137  |  96  |  21  ----------------------------<  78  3.9   |  28  |  0.65    Ca    8.4      30 May 2023 06:45        CAPILLARY BLOOD GLUCOSE      POCT Blood Glucose.: 95 mg/dL (31 May 2023 06:36)  POCT Blood Glucose.: 108 mg/dL (31 May 2023 00:30)  POCT Blood Glucose.: 141 mg/dL (30 May 2023 17:31)  POCT Blood Glucose.: 128 mg/dL (30 May 2023 11:21)          LOWER EXTREMITY PHYSICAL EXAM:    Vasular: DP/PT _1/4, B/L, CFT <2_ seconds B/L, Temperature gradient _WNL, B/L.   Neuro: Epicritic sensation _Intact to the level of _Toes, B/L.  Skin: Left heel 2 cm diameter early signs of DTI: No open wounds minimal subdermal hemorrhages, no fluctuance, no conchal signs of infection.       RADIOLOGY & ADDITIONAL STUDIES:    
    Patient is a 89y old  Female who presents with a chief complaint of failure to thrive (31 May 2023 07:10)        SUBJECTIVE / OVERNIGHT EVENTS: Patient had no acute events overnight. Patient seen and examined at bedside this morning. Spoke to patient in mandarian. Reports that she feels cold with the blanket. no sob.        MEDICATIONS  (STANDING):  apixaban 2.5 milliGRAM(s) Oral every 12 hours  atorvastatin 10 milliGRAM(s) Oral at bedtime  chlorhexidine 2% Cloths 1 Application(s) Topical daily  ferrous    sulfate 325 milliGRAM(s) Oral daily  furosemide    Tablet 40 milliGRAM(s) Oral daily  metoprolol succinate ER 50 milliGRAM(s) Oral daily  predniSONE   Tablet 5 milliGRAM(s) Oral daily    MEDICATIONS  (PRN):  acetaminophen     Tablet .. 650 milliGRAM(s) Oral every 6 hours PRN Temp greater or equal to 38C (100.4F), Mild Pain (1 - 3)  aluminum hydroxide/magnesium hydroxide/simethicone Suspension 30 milliLiter(s) Oral every 4 hours PRN Dyspepsia  melatonin 3 milliGRAM(s) Oral at bedtime PRN Insomnia      Vital Signs Last 24 Hrs  T(C): 36.5 (31 May 2023 11:11), Max: 36.6 (30 May 2023 20:53)  T(F): 97.7 (31 May 2023 11:11), Max: 97.9 (30 May 2023 20:53)  HR: 86 (31 May 2023 11:11) (83 - 96)  BP: 117/68 (31 May 2023 11:11) (111/71 - 126/81)  BP(mean): --  RR: 18 (31 May 2023 11:11) (18 - 18)  SpO2: 95% (31 May 2023 11:11) (95% - 98%)    Parameters below as of 31 May 2023 11:11  Patient On (Oxygen Delivery Method): room air      CAPILLARY BLOOD GLUCOSE      POCT Blood Glucose.: 102 mg/dL (31 May 2023 11:24)  POCT Blood Glucose.: 95 mg/dL (31 May 2023 06:36)  POCT Blood Glucose.: 108 mg/dL (31 May 2023 00:30)  POCT Blood Glucose.: 141 mg/dL (30 May 2023 17:31)    I&O's Summary    30 May 2023 07:01  -  31 May 2023 07:00  --------------------------------------------------------  IN: 720 mL / OUT: 2570 mL / NET: -1850 mL    31 May 2023 07:01  -  31 May 2023 14:54  --------------------------------------------------------  IN: 600 mL / OUT: 1500 mL / NET: -900 mL        PHYSICAL EXAM  CONSTITUTIONAL: NAD, well-developed  RESPIRATORY:decreased BS @ bases  CARDIOVASCULAR: Regular rate and rhythm, normal S1 and S2, no murmur/rub/gallop; No Peripheral pulses are 2+ bilaterally  ABDOMEN: Nontender to palpation, normoactive bowel sounds, no rebound/guarding; No hepatosplenomegaly  MUSCLOSKELETAL: no clubbing or cyanosis of digits; no joint swelling or tenderness to palpation. No RLE tenderness or knee swelling  EXT: +2 edema., improving. Thinned skin.   PSYCH: A+O to person, place, and time; affect appropriate    LABS:                        9.3    6.19  )-----------( 320      ( 30 May 2023 06:45 )             29.8     05-30    137  |  96  |  21  ----------------------------<  78  3.9   |  28  |  0.65    Ca    8.4      30 May 2023 06:45                  RADIOLOGY & ADDITIONAL TESTS:      Labs Personally Reviewed  Imaging Personally Reviewed  Consultant(s) Notes Reviewed   
PROGRESS NOTE:   Authoted by Dr. Sean Epps DO  Pager [phone]     Patient is a 89y old  Female who presents with a chief complaint of Failure to thrive in adult    SUBJECTIVE / OVERNIGHT EVENTS: No events overnight. Patient without complaints feeling better. LE edema is improving. No chest pain,sOB    ADDITIONAL REVIEW OF SYSTEMS:    MEDICATIONS  (STANDING):  atorvastatin 10 milliGRAM(s) Oral at bedtime  chlorhexidine 2% Cloths 1 Application(s) Topical daily  ferrous    sulfate 325 milliGRAM(s) Oral daily  furosemide   Injectable 40 milliGRAM(s) IV Push daily  magnesium sulfate  IVPB 1 Gram(s) IV Intermittent once  metoprolol succinate ER 50 milliGRAM(s) Oral daily  potassium chloride   Powder 40 milliEquivalent(s) Oral every 4 hours  predniSONE   Tablet 5 milliGRAM(s) Oral daily    MEDICATIONS  (PRN):  acetaminophen     Tablet .. 650 milliGRAM(s) Oral every 6 hours PRN Temp greater or equal to 38C (100.4F), Mild Pain (1 - 3)  aluminum hydroxide/magnesium hydroxide/simethicone Suspension 30 milliLiter(s) Oral every 4 hours PRN Dyspepsia  melatonin 3 milliGRAM(s) Oral at bedtime PRN Insomnia      CAPILLARY BLOOD GLUCOSE      POCT Blood Glucose.: 129 mg/dL (27 May 2023 11:18)  POCT Blood Glucose.: 110 mg/dL (27 May 2023 06:32)  POCT Blood Glucose.: 119 mg/dL (26 May 2023 23:47)  POCT Blood Glucose.: 154 mg/dL (26 May 2023 17:42)    I&O's Summary    26 May 2023 07:  -  27 May 2023 07:00  --------------------------------------------------------  IN: 200 mL / OUT: 1500 mL / NET: -1300 mL    27 May 2023 07:01  -  27 May 2023 15:01  --------------------------------------------------------  IN: 480 mL / OUT: 1100 mL / NET: -620 mL        PHYSICAL EXAM:  Vital Signs Last 24 Hrs  T(C): 36.6 (27 May 2023 11:42), Max: 36.9 (26 May 2023 20:03)  T(F): 97.8 (27 May 2023 11:42), Max: 98.5 (26 May 2023 20:03)  HR: 79 (27 May 2023 11:42) (66 - 98)  BP: 130/87 (27 May 2023 11:42) (124/86 - 144/97)  BP(mean): --  RR: 18 (27 May 2023 11:42) (18 - 18)  SpO2: 98% (27 May 2023 11:42) (96% - 100%)    Parameters below as of 27 May 2023 11:42  Patient On (Oxygen Delivery Method): room air        CONSTITUTIONAL: NAD, well-developed  RESPIRATORY:decreased BS @ bases  CARDIOVASCULAR: Regular rate and rhythm, normal S1 and S2, no murmur/rub/gallop; No Peripheral pulses are 2+ bilaterally  ABDOMEN: Nontender to palpation, normoactive bowel sounds, no rebound/guarding; No hepatosplenomegaly  MUSCLOSKELETAL: no clubbing or cyanosis of digits; no joint swelling or tenderness to palpation  EXT: +2 edema. Thinned skin.   PSYCH: A+O to person, place, and time; affect appropriate    LABS:                        10.1   8.13  )-----------( 315      ( 27 May 2023 09:48 )             32.3     -    136  |  93<L>  |  10  ----------------------------<  143<H>  2.8<LL>   |  28  |  0.54    Ca    7.8<L>      27 May 2023 09:48  Phos  2.0     -  Mg     1.5         TPro  5.7<L>  /  Alb  3.2<L>  /  TBili  0.4  /  DBili  x   /  AST  42<H>  /  ALT  16  /  AlkPhos  88  27          Urinalysis Basic - ( 25 May 2023 20:36 )    Color: Yellow / Appearance: Clear / S.016 / pH: x  Gluc: x / Ketone: Trace  / Bili: Negative / Urobili: Negative   Blood: x / Protein: 30 mg/dL / Nitrite: Negative   Leuk Esterase: Negative / RBC: 10 /hpf / WBC 6 /HPF   Sq Epi: x / Non Sq Epi: x / Bacteria: Negative        Culture - Urine (collected 25 May 2023 20:36)  Source: Clean Catch Clean Catch (Midstream)  Final Report (26 May 2023 22:44):    <10,000 CFU/mL Normal Urogenital Amy        RADIOLOGY & ADDITIONAL TESTS:  Results Reviewed:   Imaging Personally Reviewed:  Electrocardiogram Personally Reviewed:    COORDINATION OF CARE:  Care Discussed with Consultants/Other Providers [Y/N]:  Prior or Outpatient Records Reviewed [Y/N]:  
    Patient is a 89y old  Female who presents with a chief complaint of failure to thrive (30 May 2023 11:50)        SUBJECTIVE / OVERNIGHT EVENTS: Patient had no acute events overnight. Patient seen and examined at bedside this morning. Spoke to patient in Mandarin. Endorsed that she had poster knee pain on the RLE but self resolved.     ROS: [ - ] Fever [ - ] Chills [ - ] Nausea/Vomiting [ - ] Chest Pain [ - ] Shortness of breath     MEDICATIONS  (STANDING):  apixaban 2.5 milliGRAM(s) Oral every 12 hours  atorvastatin 10 milliGRAM(s) Oral at bedtime  chlorhexidine 2% Cloths 1 Application(s) Topical daily  ferrous    sulfate 325 milliGRAM(s) Oral daily  furosemide    Tablet 40 milliGRAM(s) Oral daily  metoprolol succinate ER 50 milliGRAM(s) Oral daily  predniSONE   Tablet 5 milliGRAM(s) Oral daily    MEDICATIONS  (PRN):  acetaminophen     Tablet .. 650 milliGRAM(s) Oral every 6 hours PRN Temp greater or equal to 38C (100.4F), Mild Pain (1 - 3)  aluminum hydroxide/magnesium hydroxide/simethicone Suspension 30 milliLiter(s) Oral every 4 hours PRN Dyspepsia  melatonin 3 milliGRAM(s) Oral at bedtime PRN Insomnia      Vital Signs Last 24 Hrs  T(C): 36.5 (30 May 2023 11:32), Max: 37 (29 May 2023 20:07)  T(F): 97.7 (30 May 2023 11:32), Max: 98.6 (29 May 2023 20:07)  HR: 96 (30 May 2023 16:45) (79 - 96)  BP: 111/71 (30 May 2023 16:45) (111/71 - 142/92)  BP(mean): --  RR: 18 (30 May 2023 11:32) (18 - 18)  SpO2: 95% (30 May 2023 11:32) (95% - 98%)    Parameters below as of 30 May 2023 11:32  Patient On (Oxygen Delivery Method): room air      CAPILLARY BLOOD GLUCOSE      POCT Blood Glucose.: 141 mg/dL (30 May 2023 17:31)  POCT Blood Glucose.: 128 mg/dL (30 May 2023 11:21)  POCT Blood Glucose.: 108 mg/dL (30 May 2023 06:26)  POCT Blood Glucose.: 128 mg/dL (30 May 2023 00:27)    I&O's Summary    29 May 2023 07:01  -  30 May 2023 07:00  --------------------------------------------------------  IN: 1060 mL / OUT: 2600 mL / NET: -1540 mL    30 May 2023 07:01  -  30 May 2023 18:12  --------------------------------------------------------  IN: 600 mL / OUT: 1600 mL / NET: -1000 mL        PHYSICAL EXAM  CONSTITUTIONAL: NAD, well-developed  RESPIRATORY:decreased BS @ bases  CARDIOVASCULAR: Regular rate and rhythm, normal S1 and S2, no murmur/rub/gallop; No Peripheral pulses are 2+ bilaterally  ABDOMEN: Nontender to palpation, normoactive bowel sounds, no rebound/guarding; No hepatosplenomegaly  MUSCLOSKELETAL: no clubbing or cyanosis of digits; no joint swelling or tenderness to palpation. No RLE tenderness or knee swelling  EXT: +2 edema., improving. Thinned skin.   PSYCH: A+O to person, place, and time; affect appropriate    LABS:                        9.3    6.19  )-----------( 320      ( 30 May 2023 06:45 )             29.8     05-30    137  |  96  |  21  ----------------------------<  78  3.9   |  28  |  0.65    Ca    8.4      30 May 2023 06:45  Phos  2.0     05-29  Mg     1.7     05-29                  RADIOLOGY & ADDITIONAL TESTS:      Labs Personally Reviewed  Imaging Personally Reviewed  Consultant(s) Notes Reviewed   
PROGRESS NOTE:   Authoted by Dr. Sean Epps DO  Pager [phone]     Patient is a 89y old  Female who presents with a chief complaint of Failure to thrive in adult    SUBJECTIVE / OVERNIGHT EVENTS: No events overnight. Patient feels ok well, no complaints.     ADDITIONAL REVIEW OF SYSTEMS:    MEDICATIONS  (STANDING):  atorvastatin 10 milliGRAM(s) Oral at bedtime  chlorhexidine 2% Cloths 1 Application(s) Topical daily  ferrous    sulfate 325 milliGRAM(s) Oral daily  furosemide   Injectable 40 milliGRAM(s) IV Push daily  magnesium sulfate  IVPB 1 Gram(s) IV Intermittent once  metoprolol succinate ER 50 milliGRAM(s) Oral daily  potassium chloride   Powder 40 milliEquivalent(s) Oral every 4 hours  predniSONE   Tablet 5 milliGRAM(s) Oral daily    MEDICATIONS  (PRN):  acetaminophen     Tablet .. 650 milliGRAM(s) Oral every 6 hours PRN Temp greater or equal to 38C (100.4F), Mild Pain (1 - 3)  aluminum hydroxide/magnesium hydroxide/simethicone Suspension 30 milliLiter(s) Oral every 4 hours PRN Dyspepsia  melatonin 3 milliGRAM(s) Oral at bedtime PRN Insomnia      CAPILLARY BLOOD GLUCOSE      POCT Blood Glucose.: 151 mg/dL (28 May 2023 11:24)  POCT Blood Glucose.: 115 mg/dL (28 May 2023 06:25)  POCT Blood Glucose.: 116 mg/dL (27 May 2023 23:41)  POCT Blood Glucose.: 110 mg/dL (27 May 2023 17:10)      I&O's Summary    27 May 2023 07:  -  28 May 2023 07:00  --------------------------------------------------------  IN: 980 mL / OUT: 1102 mL / NET: -122 mL    28 May 2023 07:01  -  28 May 2023 12:47  --------------------------------------------------------  IN: 720 mL / OUT: 0 mL / NET: 720 mL            Vital Signs Last 24 Hrs  T(C): 36.7 (28 May 2023 12:06), Max: 36.7 (28 May 2023 04:59)  T(F): 98.1 (28 May 2023 12:06), Max: 98.1 (28 May 2023 04:59)  HR: 80 (28 May 2023 12:06) (80 - 90)  BP: 134/90 (28 May 2023 12:06) (116/77 - 134/90)  BP(mean): --  RR: 18 (28 May 2023 12:06) (18 - 18)  SpO2: 95% (28 May 2023 12:06) (95% - 97%)    Parameters below as of 28 May 2023 12:06  Patient On (Oxygen Delivery Method): room air            CONSTITUTIONAL: NAD, well-developed  RESPIRATORY:decreased BS @ bases  CARDIOVASCULAR: Regular rate and rhythm, normal S1 and S2, no murmur/rub/gallop; No Peripheral pulses are 2+ bilaterally  ABDOMEN: Nontender to palpation, normoactive bowel sounds, no rebound/guarding; No hepatosplenomegaly  MUSCLOSKELETAL: no clubbing or cyanosis of digits; no joint swelling or tenderness to palpation  EXT: +2 edema., improving. Thinned skin.   PSYCH: A+O to person, place, and time; affect appropriate    LABS:                        10.                      9.2    7.07  )-----------( 291      ( 28 May 2023 06:26 )             29.3   05-28    135  |  96  |  11  ----------------------------<  119<H>  4.6   |  26  |  0.55    Ca    8.0<L>      28 May 2023 12:00  Phos  1.4     05-  Mg     2.0     -    TPro  5.7<L>  /  Alb  3.2<L>  /  TBili  0.4  /  DBili  x   /  AST  42<H>  /  ALT  16  /  AlkPhos  88  05-27            Urinalysis Basic - ( 25 May 2023 20:36 )    Color: Yellow / Appearance: Clear / S.016 / pH: x  Gluc: x / Ketone: Trace  / Bili: Negative / Urobili: Negative   Blood: x / Protein: 30 mg/dL / Nitrite: Negative   Leuk Esterase: Negative / RBC: 10 /hpf / WBC 6 /HPF   Sq Epi: x / Non Sq Epi: x / Bacteria: Negative        Culture - Urine (collected 25 May 2023 20:36)  Source: Clean Catch Clean Catch (Midstream)  Final Report (26 May 2023 22:44):    <10,000 CFU/mL Normal Urogenital Amy        RADIOLOGY & ADDITIONAL TESTS:  Results Reviewed:   Imaging Personally Reviewed:  Electrocardiogram Personally Reviewed:    COORDINATION OF CARE:  Care Discussed with Consultants/Other Providers [Y/N]:  Prior or Outpatient Records Reviewed [Y/N]:  
PROGRESS NOTE:   Authoted by Dr. Sean Epps DO  Pager [phone]     Patient is a 89y old  Female who presents with a chief complaint of Failure to thrive in adult    SUBJECTIVE / OVERNIGHT EVENTS: No events overnight. Family serves as  given specific dialect. Patient feels ok well, no complaints.     ADDITIONAL REVIEW OF SYSTEMS:      CAPILLARY BLOOD GLUCOSE      POCT Blood Glucose.: 163 mg/dL (29 May 2023 11:31)  POCT Blood Glucose.: 105 mg/dL (29 May 2023 06:24)  POCT Blood Glucose.: 207 mg/dL (28 May 2023 23:47)  POCT Blood Glucose.: 144 mg/dL (28 May 2023 17:59)        I&O's Summary    27 May 2023 07:01  -  28 May 2023 07:00  --------------------------------------------------------  IN: 980 mL / OUT: 1102 mL / NET: -122 mL    28 May 2023 07:01  -  28 May 2023 12:47  --------------------------------------------------------  IN: 720 mL / OUT: 0 mL / NET: 720 mL      Vital Signs Last 24 Hrs  T(C): 36.7 (29 May 2023 11:22), Max: 36.8 (29 May 2023 04:59)  T(F): 98 (29 May 2023 11:22), Max: 98.2 (29 May 2023 04:59)  HR: 59 (29 May 2023 11:22) (59 - 83)  BP: 107/71 (29 May 2023 11:22) (107/71 - 136/85)  BP(mean): --  RR: 18 (29 May 2023 11:22) (18 - 18)  SpO2: 96% (29 May 2023 11:22) (95% - 96%)    Parameters below as of 29 May 2023 11:22  Patient On (Oxygen Delivery Method): room air      CONSTITUTIONAL: NAD, well-developed  RESPIRATORY:decreased BS @ bases  CARDIOVASCULAR: Regular rate and rhythm, normal S1 and S2, no murmur/rub/gallop; No Peripheral pulses are 2+ bilaterally  ABDOMEN: Nontender to palpation, normoactive bowel sounds, no rebound/guarding; No hepatosplenomegaly  MUSCLOSKELETAL: no clubbing or cyanosis of digits; no joint swelling or tenderness to palpation  EXT: +2 edema., improving. Thinned skin.   PSYCH: A+O to person, place, and time; affect appropriate    LABS:      133<L>  |  97  |  18  ----------------------------<  80  4.9   |  26  |  0.59    Ca    8.0<L>      29 May 2023 06:36  Phos  2.0       Mg     1.7                 Urinalysis Basic - ( 25 May 2023 20:36 )    Color: Yellow / Appearance: Clear / S.016 / pH: x  Gluc: x / Ketone: Trace  / Bili: Negative / Urobili: Negative   Blood: x / Protein: 30 mg/dL / Nitrite: Negative   Leuk Esterase: Negative / RBC: 10 /hpf / WBC 6 /HPF   Sq Epi: x / Non Sq Epi: x / Bacteria: Negative        Culture - Urine (collected 25 May 2023 20:36)  Source: Clean Catch Clean Catch (Midstream)  Final Report (26 May 2023 22:44):    <10,000 CFU/mL Normal Urogenital Amy        RADIOLOGY & ADDITIONAL TESTS:  Results Reviewed:   Imaging Personally Reviewed:  Electrocardiogram Personally Reviewed:    COORDINATION OF CARE:  Care Discussed with Consultants/Other Providers [Y/N]:  Prior or Outpatient Records Reviewed [Y/N]: